# Patient Record
Sex: FEMALE | Race: WHITE | Employment: UNEMPLOYED | URBAN - METROPOLITAN AREA
[De-identification: names, ages, dates, MRNs, and addresses within clinical notes are randomized per-mention and may not be internally consistent; named-entity substitution may affect disease eponyms.]

---

## 2020-05-14 ENCOUNTER — OFFICE VISIT (OUTPATIENT)
Dept: FAMILY MEDICINE CLINIC | Age: 50
End: 2020-05-14

## 2020-05-14 VITALS
DIASTOLIC BLOOD PRESSURE: 82 MMHG | OXYGEN SATURATION: 98 % | WEIGHT: 210 LBS | SYSTOLIC BLOOD PRESSURE: 116 MMHG | HEART RATE: 89 BPM | BODY MASS INDEX: 33.89 KG/M2

## 2020-05-14 PROCEDURE — 99202 OFFICE O/P NEW SF 15 MIN: CPT | Performed by: FAMILY MEDICINE

## 2020-05-14 PROCEDURE — 96372 THER/PROPH/DIAG INJ SC/IM: CPT | Performed by: FAMILY MEDICINE

## 2020-05-14 RX ORDER — ESCITALOPRAM OXALATE 20 MG/1
10 TABLET ORAL DAILY
Qty: 30 TABLET | Refills: 12 | Status: SHIPPED | OUTPATIENT
Start: 2020-05-14 | End: 2021-07-17 | Stop reason: SDUPTHER

## 2020-05-14 RX ORDER — CYANOCOBALAMIN 1000 UG/ML
INJECTION INTRAMUSCULAR; SUBCUTANEOUS
Qty: 10 ML | Refills: 5 | Status: SHIPPED | OUTPATIENT
Start: 2020-05-14 | End: 2020-05-18 | Stop reason: SDUPTHER

## 2020-05-14 RX ORDER — CYANOCOBALAMIN 1000 UG/ML
1000 INJECTION INTRAMUSCULAR; SUBCUTANEOUS ONCE
Status: COMPLETED | OUTPATIENT
Start: 2020-05-14 | End: 2020-05-14

## 2020-05-14 RX ORDER — METOPROLOL TARTRATE 50 MG/1
50 TABLET, FILM COATED ORAL 2 TIMES DAILY
Qty: 60 TABLET | Refills: 5 | Status: SHIPPED | OUTPATIENT
Start: 2020-05-14 | End: 2021-07-17 | Stop reason: SDUPTHER

## 2020-05-14 RX ORDER — ESCITALOPRAM OXALATE 10 MG/1
10 TABLET ORAL DAILY
COMMUNITY
End: 2020-05-14

## 2020-05-14 RX ADMIN — CYANOCOBALAMIN 1000 MCG: 1000 INJECTION INTRAMUSCULAR; SUBCUTANEOUS at 11:20

## 2020-05-14 ASSESSMENT — PATIENT HEALTH QUESTIONNAIRE - PHQ9
SUM OF ALL RESPONSES TO PHQ QUESTIONS 1-9: 0
2. FEELING DOWN, DEPRESSED OR HOPELESS: 0
SUM OF ALL RESPONSES TO PHQ QUESTIONS 1-9: 0
1. LITTLE INTEREST OR PLEASURE IN DOING THINGS: 0
SUM OF ALL RESPONSES TO PHQ9 QUESTIONS 1 & 2: 0

## 2020-05-14 ASSESSMENT — ENCOUNTER SYMPTOMS
ABDOMINAL PAIN: 1
COUGH: 0
BACK PAIN: 0
SHORTNESS OF BREATH: 0
CHEST TIGHTNESS: 0
WHEEZING: 0

## 2020-05-14 NOTE — PROGRESS NOTES
Last 3 Encounters:   05/14/20 116/82     Wt Readings from Last 3 Encounters:   05/14/20 210 lb (95.3 kg)     Body mass index is 33.89 kg/m². No results found for this visit on 05/14/20. Physical Exam  Vitals signs and nursing note reviewed. Constitutional:       General: She is not in acute distress. Appearance: She is well-developed. She is not diaphoretic. HENT:      Head: Normocephalic. Nose: Nose normal.   Eyes:      General:         Right eye: No discharge. Left eye: No discharge. Conjunctiva/sclera: Conjunctivae normal.      Pupils: Pupils are equal, round, and reactive to light. Neck:      Musculoskeletal: Normal range of motion. Cardiovascular:      Rate and Rhythm: Normal rate and regular rhythm. Heart sounds: Normal heart sounds. No murmur. Pulmonary:      Effort: Pulmonary effort is normal. No respiratory distress. Breath sounds: Normal breath sounds. No wheezing or rales. Abdominal:      General: Abdomen is flat. There is no distension. Palpations: Abdomen is soft. There is no mass. Tenderness: There is no abdominal tenderness. There is no rebound. Hernia: No hernia is present. Skin:     General: Skin is warm and dry. Neurological:      Mental Status: She is alert and oriented to person, place, and time. Psychiatric:         Behavior: Behavior normal.         Intake paperwork reviewed in detail and scanned to chart. Controlled Substances Monitoring: few norco in march from provider in [place]                         _________________________________________________  Assessment:     Ilaemi Gold was seen today for new patient, palpitations, anxiety and fatigue. Diagnoses and all orders for this visit:    EMIR (generalized anxiety disorder)  -     escitalopram (LEXAPRO) 20 MG tablet; Take 0.5 tablets by mouth daily    Fatigue, unspecified type  -     cyanocobalamin 1000 MCG/ML injection;  As directed: TIW x 2 weeks, BIW x 2 weeks,

## 2020-05-18 RX ORDER — CYANOCOBALAMIN 1000 UG/ML
INJECTION INTRAMUSCULAR; SUBCUTANEOUS
Qty: 10 ML | Refills: 5 | Status: SHIPPED | OUTPATIENT
Start: 2020-05-18 | End: 2020-05-19 | Stop reason: SDUPTHER

## 2020-05-19 RX ORDER — CYANOCOBALAMIN 1000 UG/ML
INJECTION INTRAMUSCULAR; SUBCUTANEOUS
Qty: 10 ML | Refills: 5 | Status: SHIPPED | OUTPATIENT
Start: 2020-05-19 | End: 2021-07-17 | Stop reason: SDUPTHER

## 2021-03-16 ENCOUNTER — HOSPITAL ENCOUNTER (OUTPATIENT)
Age: 51
Setting detail: SPECIMEN
Discharge: HOME | End: 2021-03-16
Payer: COMMERCIAL

## 2021-03-16 PROCEDURE — 87624 HPV HI-RISK TYP POOLED RSLT: CPT

## 2021-03-16 PROCEDURE — 88142 CYTOPATH C/V THIN LAYER: CPT

## 2021-03-25 ENCOUNTER — HOSPITAL ENCOUNTER (OUTPATIENT)
Dept: LAB | Age: 51
Discharge: HOME | End: 2021-03-25
Payer: COMMERCIAL

## 2021-03-25 PROCEDURE — U0003 INFECTIOUS AGENT DETECTION BY NUCLEIC ACID (DNA OR RNA); SEVERE ACUTE RESPIRATORY SYNDROME CORONAVIRUS 2 (SARS-COV-2) (CORONAVIRUS DISEASE [COVID-19]), AMPLIFIED PROBE TECHNIQUE, MAKING USE OF HIGH THROUGHPUT TECHNOLOGIES AS DESCRIBED BY CMS-2020-01-R: HCPCS

## 2021-03-25 PROCEDURE — C9803 HOPD COVID-19 SPEC COLLECT: HCPCS

## 2021-03-25 PROCEDURE — U0005 INFEC AGEN DETEC AMPLI PROBE: HCPCS

## 2021-03-26 LAB
SARS-COV-2: NOT DETECTED
SOURCE: NORMAL

## 2021-03-26 RX ORDER — OMEPRAZOLE 20 MG/1
20 CAPSULE, DELAYED RELEASE ORAL DAILY
COMMUNITY
End: 2022-08-03 | Stop reason: SDUPTHER

## 2021-03-26 NOTE — PROGRESS NOTES
.,Surgery 3/31/21, you will be called 3/30/21with times               1. Do not eat or drink anything after midnight - unless instructed by your doctor prior to surgery. This includes                   no water, chewing gum or mints. 2. Follow your directions as prescribed by the doctor for your procedure and medications. Take Metoprolol & Omeprazole in am with a sip. 3. Check with your Doctor regarding stopping Plavix, Coumadin, Lovenox,Effient,Pradaxa,Xarelto, Fragmin or other blood thinners and                   follow their instructions. 4. Do not smoke, and do not drink any alcoholic beverages 24 hours prior to surgery. This includes NA Beer. 5. You may brush your teeth and gargle the morning of surgery. DO NOT SWALLOW WATER   6. You MUST make arrangements for a responsible adult to take you home after your surgery and be able to check on you every couple                   hours for the day. You will not be allowed to leave alone or drive yourself home. It is strongly suggested someone stay with you the first 24                   hrs. Your surgery will be cancelled if you do not have a ride home. 7. Please wear simple, loose fitting clothing to the hospital.  [facility] Offer not bring valuables (money, credit cards, checkbooks, etc.) Do not wear any                   makeup (including no eye makeup) or nail polish on your fingers or toes. 8. DO NOT wear any jewelry or piercings on day of surgery. All body piercing jewelry must be removed. 9. If you have dentures, they will be removed before going to the OR; we will provide you a container. If you wear contact lenses or glasses,                  they will be removed; please bring a case for them. 10. If you  have a Living Will and Durable Power of  for Healthcare, please bring in a copy.            11. Please bring picture ID,  insurance card, paperwork from the doctors office    (H & P, Consent, & card for implantable devices). 12. Take a shower the night before or morning of your procedure, do not apply any lotion, oil or powder. 13. Wear a mask covering your nose & mouth when entering the hospital. Have your covid-19 test performed within 10 days of your                  Surgery (tested neg 3/25/21). Quarantine yourself after the test until after your surgery.

## 2021-03-30 ENCOUNTER — ANESTHESIA EVENT (OUTPATIENT)
Dept: OPERATING ROOM | Age: 51
End: 2021-03-30
Payer: COMMERCIAL

## 2021-03-30 NOTE — ANESTHESIA PRE PROCEDURE
Department of Anesthesiology  Preprocedure Note       Name:  Radha Hatfield   Age:  54 y.o.  :  1970                                          MRN:  [id number]         Date:  3/30/2021      Surgeon: Adalgisa Love):  Dayana Ruiz DO    Procedure: Procedure(s):  DILATATION AND CURETTAGE HYSTEROSCOPY    Medications prior to admission:   Prior to Admission medications    Medication Sig Start Date End Date Taking? Authorizing Provider   omeprazole (PRILOSEC) 20 MG delayed release capsule Take 20 mg by mouth daily   Yes Historical Provider, MD   cyanocobalamin 1000 MCG/ML injection As directed: TIW x 2 weeks, BIW x 2 weeks, then weekly x 3 months. Then monthly. 20   Maria T Sommers MD   escitalopram (LEXAPRO) 20 MG tablet Take 0.5 tablets by mouth daily 20   Maria T Sommers MD   metoprolol tartrate (LOPRESSOR) 50 MG tablet Take 1 tablet by mouth 2 times daily 20   Maria T Sommers MD   SYRINGE-NEEDLE, DISP, 3 ML 25G X 1\" 3 ML MISC Use initially tiw for b12 dosing, then eventually to once monthly 20   Maria T Sommers MD       Current medications:    No current facility-administered medications for this encounter. Current Outpatient Medications   Medication Sig Dispense Refill    omeprazole (PRILOSEC) 20 MG delayed release capsule Take 20 mg by mouth daily      cyanocobalamin 1000 MCG/ML injection As directed: TIW x 2 weeks, BIW x 2 weeks, then weekly x 3 months. Then monthly. 10 mL 5    escitalopram (LEXAPRO) 20 MG tablet Take 0.5 tablets by mouth daily 30 tablet 12    metoprolol tartrate (LOPRESSOR) 50 MG tablet Take 1 tablet by mouth 2 times daily 60 tablet 5    SYRINGE-NEEDLE, DISP, 3 ML 25G X 1\" 3 ML MISC Use initially tiw for b12 dosing, then eventually to once monthly 10 each 5       Allergies:  No Known Allergies    Problem List:  There is no problem list on file for this patient.       Past Medical History:        Diagnosis Date    GERD (gastroesophageal reflux disease)     SVT (supraventricular tachycardia) (HCC)     Takes Metoprolol - previously saw Dr. Angel Taveras, now PCP       Past Surgical History:        Procedure Laterality Date    VENTRICULAR ABLATION SURGERY  2005       Social History:    Social History     Tobacco Use    Smoking status: Never Smoker    Smokeless tobacco: Never Used   Substance Use Topics    Alcohol use: Yes     Comment: 3-4 drinks a month                                Counseling given: Not Answered      Vital Signs (Current):   Vitals:    03/26/21 1444   Weight: 195 lb (88.5 kg)   Height: 5' 6\" (1.676 m)                                              BP Readings from Last 3 Encounters:   05/14/20 116/82       NPO Status:                                                                                 BMI:   Wt Readings from Last 3 Encounters:   05/14/20 210 lb (95.3 kg)     Body mass index is 31.47 kg/m². CBC:   Lab Results   Component Value Date    WBC 8.0 02/08/2015    RBC 4.81 02/08/2015    HGB 14.6 02/08/2015    HCT 43.5 02/08/2015    MCV 90.4 02/08/2015    RDW 12.7 02/08/2015     02/08/2015       CMP:   Lab Results   Component Value Date     02/08/2015    K 3.9 02/08/2015     02/08/2015    CO2 23 02/08/2015    BUN 12 02/08/2015    CREATININE 0.8 02/08/2015    GFRAA >60 02/08/2015    LABGLOM >60 02/08/2015    PROT 7.0 02/08/2015    CALCIUM 9.3 02/08/2015    BILITOT 0.7 02/08/2015    ALKPHOS 59 02/08/2015    AST 33 02/08/2015    ALT 50 02/08/2015       POC Tests: No results for input(s): POCGLU, POCNA, POCK, POCCL, POCBUN, POCHEMO, POCHCT in the last 72 hours.     Coags: No results found for: PROTIME, INR, APTT    HCG (If Applicable): No results found for: PREGTESTUR, PREGSERUM, HCG, HCGQUANT     ABGs: No results found for: PHART, PO2ART, XOA3XZE, ALA3HIR, BEART, N1ZWFMCQ     Type & Screen (If Applicable):  No results found for: LABABO, LABRH    Drug/Infectious Status (If Applicable):  No results found for: HIV, HEPCAB    COVID-19 Screening (If Applicable):   Lab Results   Component Value Date    COVID19 NOT DETECTED 03/25/2021           Anesthesia Evaluation  Patient summary reviewed  Airway: Mallampati: II        Dental: normal exam         Pulmonary:normal exam                               Cardiovascular:  Exercise tolerance: good (>4 METS),   (+) dysrhythmias: SVT,         Rhythm: regular  Rate: normal                    Neuro/Psych:   (+) depression/anxiety             GI/Hepatic/Renal:   (+) GERD:,           Endo/Other:              Pt had no PAT visit       Abdominal:           Vascular:                                      Anesthesia Plan      general     ASA 2     (Covid - 3/29/2021)  Induction: intravenous. MIPS: Postoperative opioids intended and Prophylactic antiemetics administered. Anesthetic plan and risks discussed with patient. Plan discussed with CRNA.     Attending anesthesiologist reviewed and agrees with Pre Eval content          INNA Willis - CRNA   3/30/2021

## 2021-03-31 ENCOUNTER — ANESTHESIA (OUTPATIENT)
Dept: OPERATING ROOM | Age: 51
End: 2021-03-31
Payer: COMMERCIAL

## 2021-03-31 ENCOUNTER — HOSPITAL ENCOUNTER (OUTPATIENT)
Age: 51
Setting detail: OUTPATIENT SURGERY
Discharge: HOME | End: 2021-03-31
Attending: OBSTETRICS & GYNECOLOGY | Admitting: OBSTETRICS & GYNECOLOGY
Payer: COMMERCIAL

## 2021-03-31 VITALS
SYSTOLIC BLOOD PRESSURE: 119 MMHG | RESPIRATION RATE: 16 BRPM | BODY MASS INDEX: 31.34 KG/M2 | TEMPERATURE: 98.1 F | HEART RATE: 70 BPM | HEIGHT: 66 IN | DIASTOLIC BLOOD PRESSURE: 56 MMHG | WEIGHT: 195 LBS | OXYGEN SATURATION: 98 %

## 2021-03-31 VITALS
DIASTOLIC BLOOD PRESSURE: 93 MMHG | OXYGEN SATURATION: 99 % | RESPIRATION RATE: 5 BRPM | TEMPERATURE: 97.7 F | SYSTOLIC BLOOD PRESSURE: 129 MMHG

## 2021-03-31 DIAGNOSIS — N92.1 MENORRHAGIA WITH IRREGULAR CYCLE: ICD-10-CM

## 2021-03-31 DIAGNOSIS — G89.18 POST-OP PAIN: Primary | ICD-10-CM

## 2021-03-31 LAB — PREGNANCY TEST URINE, POC: NEGATIVE

## 2021-03-31 PROCEDURE — 3700000000 HC ANESTHESIA ATTENDED CARE: Performed by: OBSTETRICS & GYNECOLOGY

## 2021-03-31 PROCEDURE — 81025 URINE PREGNANCY TEST: CPT

## 2021-03-31 PROCEDURE — 7100000000 HC PACU RECOVERY - FIRST 15 MIN: Performed by: OBSTETRICS & GYNECOLOGY

## 2021-03-31 PROCEDURE — 6360000002 HC RX W HCPCS: Performed by: ANESTHESIOLOGY

## 2021-03-31 PROCEDURE — 88305 TISSUE EXAM BY PATHOLOGIST: CPT | Performed by: PATHOLOGY

## 2021-03-31 PROCEDURE — 3600000003 HC SURGERY LEVEL 3 BASE: Performed by: OBSTETRICS & GYNECOLOGY

## 2021-03-31 PROCEDURE — 7100000011 HC PHASE II RECOVERY - ADDTL 15 MIN: Performed by: OBSTETRICS & GYNECOLOGY

## 2021-03-31 PROCEDURE — 7100000010 HC PHASE II RECOVERY - FIRST 15 MIN: Performed by: OBSTETRICS & GYNECOLOGY

## 2021-03-31 PROCEDURE — 7100000001 HC PACU RECOVERY - ADDTL 15 MIN: Performed by: OBSTETRICS & GYNECOLOGY

## 2021-03-31 PROCEDURE — 2709999900 HC NON-CHARGEABLE SUPPLY: Performed by: OBSTETRICS & GYNECOLOGY

## 2021-03-31 PROCEDURE — 3700000001 HC ADD 15 MINUTES (ANESTHESIA): Performed by: OBSTETRICS & GYNECOLOGY

## 2021-03-31 PROCEDURE — 6360000002 HC RX W HCPCS: Performed by: NURSE ANESTHETIST, CERTIFIED REGISTERED

## 2021-03-31 PROCEDURE — 3600000013 HC SURGERY LEVEL 3 ADDTL 15MIN: Performed by: OBSTETRICS & GYNECOLOGY

## 2021-03-31 PROCEDURE — 2580000003 HC RX 258: Performed by: ANESTHESIOLOGY

## 2021-03-31 RX ORDER — HYDRALAZINE HYDROCHLORIDE 20 MG/ML
5 INJECTION INTRAMUSCULAR; INTRAVENOUS EVERY 10 MIN PRN
Status: DISCONTINUED | OUTPATIENT
Start: 2021-03-31 | End: 2021-03-31 | Stop reason: HOSPADM

## 2021-03-31 RX ORDER — DEXAMETHASONE SODIUM PHOSPHATE 4 MG/ML
INJECTION, SOLUTION INTRA-ARTICULAR; INTRALESIONAL; INTRAMUSCULAR; INTRAVENOUS; SOFT TISSUE PRN
Status: DISCONTINUED | OUTPATIENT
Start: 2021-03-31 | End: 2021-03-31 | Stop reason: SDUPTHER

## 2021-03-31 RX ORDER — ONDANSETRON 2 MG/ML
INJECTION INTRAMUSCULAR; INTRAVENOUS PRN
Status: DISCONTINUED | OUTPATIENT
Start: 2021-03-31 | End: 2021-03-31 | Stop reason: SDUPTHER

## 2021-03-31 RX ORDER — HYDROMORPHONE HCL 110MG/55ML
0.25 PATIENT CONTROLLED ANALGESIA SYRINGE INTRAVENOUS EVERY 5 MIN PRN
Status: DISCONTINUED | OUTPATIENT
Start: 2021-03-31 | End: 2021-03-31 | Stop reason: HOSPADM

## 2021-03-31 RX ORDER — LABETALOL HYDROCHLORIDE 5 MG/ML
5 INJECTION, SOLUTION INTRAVENOUS EVERY 10 MIN PRN
Status: DISCONTINUED | OUTPATIENT
Start: 2021-03-31 | End: 2021-03-31 | Stop reason: HOSPADM

## 2021-03-31 RX ORDER — MORPHINE SULFATE 2 MG/ML
2 INJECTION, SOLUTION INTRAMUSCULAR; INTRAVENOUS EVERY 5 MIN PRN
Status: DISCONTINUED | OUTPATIENT
Start: 2021-03-31 | End: 2021-03-31 | Stop reason: HOSPADM

## 2021-03-31 RX ORDER — ACETAMINOPHEN AND CODEINE PHOSPHATE 300; 30 MG/1; MG/1
1-2 TABLET ORAL EVERY 6 HOURS PRN
Qty: 10 TABLET | Refills: 0 | Status: SHIPPED | OUTPATIENT
Start: 2021-03-31 | End: 2021-04-03

## 2021-03-31 RX ORDER — LIDOCAINE HYDROCHLORIDE 20 MG/ML
INJECTION, SOLUTION INTRAVENOUS PRN
Status: DISCONTINUED | OUTPATIENT
Start: 2021-03-31 | End: 2021-03-31 | Stop reason: SDUPTHER

## 2021-03-31 RX ORDER — HYDROMORPHONE HCL 110MG/55ML
0.5 PATIENT CONTROLLED ANALGESIA SYRINGE INTRAVENOUS EVERY 5 MIN PRN
Status: DISCONTINUED | OUTPATIENT
Start: 2021-03-31 | End: 2021-03-31 | Stop reason: HOSPADM

## 2021-03-31 RX ORDER — PROMETHAZINE HYDROCHLORIDE 25 MG/ML
6.25 INJECTION, SOLUTION INTRAMUSCULAR; INTRAVENOUS
Status: DISCONTINUED | OUTPATIENT
Start: 2021-03-31 | End: 2021-03-31 | Stop reason: HOSPADM

## 2021-03-31 RX ORDER — KETOROLAC TROMETHAMINE 30 MG/ML
INJECTION, SOLUTION INTRAMUSCULAR; INTRAVENOUS PRN
Status: DISCONTINUED | OUTPATIENT
Start: 2021-03-31 | End: 2021-03-31 | Stop reason: SDUPTHER

## 2021-03-31 RX ORDER — SODIUM CHLORIDE, SODIUM LACTATE, POTASSIUM CHLORIDE, CALCIUM CHLORIDE 600; 310; 30; 20 MG/100ML; MG/100ML; MG/100ML; MG/100ML
INJECTION, SOLUTION INTRAVENOUS CONTINUOUS
Status: DISCONTINUED | OUTPATIENT
Start: 2021-03-31 | End: 2021-03-31 | Stop reason: HOSPADM

## 2021-03-31 RX ORDER — PROPOFOL 10 MG/ML
INJECTION, EMULSION INTRAVENOUS PRN
Status: DISCONTINUED | OUTPATIENT
Start: 2021-03-31 | End: 2021-03-31 | Stop reason: SDUPTHER

## 2021-03-31 RX ORDER — FENTANYL CITRATE 50 UG/ML
25 INJECTION, SOLUTION INTRAMUSCULAR; INTRAVENOUS EVERY 5 MIN PRN
Status: DISCONTINUED | OUTPATIENT
Start: 2021-03-31 | End: 2021-03-31 | Stop reason: HOSPADM

## 2021-03-31 RX ORDER — FENTANYL CITRATE 50 UG/ML
INJECTION, SOLUTION INTRAMUSCULAR; INTRAVENOUS PRN
Status: DISCONTINUED | OUTPATIENT
Start: 2021-03-31 | End: 2021-03-31 | Stop reason: SDUPTHER

## 2021-03-31 RX ADMIN — SODIUM CHLORIDE, POTASSIUM CHLORIDE, SODIUM LACTATE AND CALCIUM CHLORIDE: 600; 310; 30; 20 INJECTION, SOLUTION INTRAVENOUS at 09:18

## 2021-03-31 RX ADMIN — DEXAMETHASONE SODIUM PHOSPHATE 8 MG: 4 INJECTION, SOLUTION INTRAMUSCULAR; INTRAVENOUS at 10:07

## 2021-03-31 RX ADMIN — PROPOFOL 150 MG: 10 INJECTION, EMULSION INTRAVENOUS at 10:07

## 2021-03-31 RX ADMIN — FENTANYL CITRATE 25 MCG: 50 INJECTION INTRAMUSCULAR; INTRAVENOUS at 11:01

## 2021-03-31 RX ADMIN — ONDANSETRON 4 MG: 2 INJECTION INTRAMUSCULAR; INTRAVENOUS at 10:07

## 2021-03-31 RX ADMIN — SODIUM CHLORIDE, POTASSIUM CHLORIDE, SODIUM LACTATE AND CALCIUM CHLORIDE: 600; 310; 30; 20 INJECTION, SOLUTION INTRAVENOUS at 10:05

## 2021-03-31 RX ADMIN — LIDOCAINE HYDROCHLORIDE 50 MG: 20 INJECTION, SOLUTION INTRAVENOUS at 10:07

## 2021-03-31 RX ADMIN — KETOROLAC TROMETHAMINE 30 MG: 30 INJECTION, SOLUTION INTRAMUSCULAR; INTRAVENOUS at 10:29

## 2021-03-31 RX ADMIN — FENTANYL CITRATE 100 MCG: 50 INJECTION, SOLUTION INTRAMUSCULAR; INTRAVENOUS at 10:07

## 2021-03-31 ASSESSMENT — PULMONARY FUNCTION TESTS
PIF_VALUE: 10
PIF_VALUE: 10
PIF_VALUE: 8
PIF_VALUE: 1
PIF_VALUE: 3
PIF_VALUE: 11
PIF_VALUE: 10
PIF_VALUE: 11
PIF_VALUE: 2
PIF_VALUE: 11
PIF_VALUE: 8
PIF_VALUE: 4
PIF_VALUE: 23
PIF_VALUE: 24
PIF_VALUE: 10
PIF_VALUE: 10
PIF_VALUE: 9
PIF_VALUE: 5

## 2021-03-31 ASSESSMENT — PAIN DESCRIPTION - FREQUENCY: FREQUENCY: CONTINUOUS

## 2021-03-31 ASSESSMENT — PAIN SCALES - GENERAL: PAINLEVEL_OUTOF10: 2

## 2021-03-31 ASSESSMENT — PAIN DESCRIPTION - DESCRIPTORS
DESCRIPTORS: ACHING
DESCRIPTORS: CRAMPING

## 2021-03-31 ASSESSMENT — PAIN DESCRIPTION - LOCATION: LOCATION: ABDOMEN

## 2021-03-31 ASSESSMENT — PAIN DESCRIPTION - ORIENTATION: ORIENTATION: RIGHT

## 2021-03-31 ASSESSMENT — PAIN DESCRIPTION - PAIN TYPE: TYPE: SURGICAL PAIN

## 2021-03-31 NOTE — PROGRESS NOTES
1045 - transferred from OR on cart, monitor applied, alarms on and verified, bedside handoff provided by Rober Lopez and Juan Alanis CRNA  1101 - medicated for complaint of surgical pain/discomfort  1110 - tolerating ice chips  1115 - phase one care complete  1123 - transferred to [facility] 9

## 2021-03-31 NOTE — DISCHARGE SUMMARY
Admission date:3/31/21  Discharge date:3/31/21  Admission Diagnosis: Menorrhagia  Discharge Diagnosis: Same  Procedure D and C with Hysteroscopy  Hospital Course: No complications with procedure and normal Same Day Surgery care in PACU and release home with instructions and prescription for Tylenol #3 given.   Condition on Discharge: Good

## 2021-03-31 NOTE — PROGRESS NOTES
1255-pt walked to restroom, brief and pad changed, denies need  1305- Discharge instructions reviewed with pt and  Benita, no questions  1310- Ice chips provided, pt dressing  1316- pt discharged via car

## 2021-04-01 NOTE — OP NOTE
[address] [place] [address], [address] [place] [address]                                OPERATIVE REPORT    PATIENT NAME: Valerie Archer               :        1970  MED REC NO:   [id number]                          ROOM:  ACCOUNT NO:   [de-identified]                           ADMIT DATE: 2021  PROVIDER:     Isaac Espinoza DO    DATE OF PROCEDURE:  2021    PREOPERATIVE DIAGNOSIS:  Menorrhagia. POSTOPERATIVE DIAGNOSIS:  Menorrhagia. OPERATION PERFORMED:  D and C with hysteroscopy. SURGEON:  Isaac Espinoza DO    ANESTHESIA:  General.    ESTIMATED BLOOD LOSS:  5 mL. COMPLICATIONS:  None. OPERATIVE FINDINGS:  Uterine cavity of 9 cm was noted and a large volume  of redundant polypoid tissue. Otherwise, normal pelvic exam.    OPERATIVE PROCEDURE:  The patient was taken to the operating room and  after general anesthetic induction, was sterilely prepped and draped in  the usual manner for this procedure. A single-tooth tenaculum was  placed on the anterior lip of the cervix and endocervical curetting was  obtained. There was also a distinct cervical polyp that was noted and  removed as a specimen. With adequate dilation of the cervix, a  hysteroscope was utilized with saline for distending medium. Findings  as noted above. Scope was removed. Cervix was dilated further allowing  #2 sharp curette as well as polyp forceps to remove the excess  endometrium noted in the uterine cavity. When this was accomplished,  the procedure was thus finalized. The single-tooth tenaculum was  removed. Sponge and instrument count was correct. The patient having  tolerated the procedure well, was taken to the recovery room in stable  condition. Good prognosis was given.         Lissa Snellen, DO    D: 2021 17:05:02       T: 2021 22:20:58     [email or web address]  Job#: [id number]     Doc#: [id number]    CC:

## 2021-07-16 DIAGNOSIS — E53.8 B12 DEFICIENCY: ICD-10-CM

## 2021-07-16 DIAGNOSIS — R00.2 PALPITATION: ICD-10-CM

## 2021-07-16 DIAGNOSIS — F41.1 GAD (GENERALIZED ANXIETY DISORDER): ICD-10-CM

## 2021-07-16 DIAGNOSIS — R53.83 FATIGUE, UNSPECIFIED TYPE: ICD-10-CM

## 2021-07-17 RX ORDER — ESCITALOPRAM OXALATE 20 MG/1
10 TABLET ORAL DAILY
Qty: 30 TABLET | Refills: 12 | Status: SHIPPED | OUTPATIENT
Start: 2021-07-17 | End: 2022-08-03 | Stop reason: SDUPTHER

## 2021-07-17 RX ORDER — CYANOCOBALAMIN 1000 UG/ML
INJECTION INTRAMUSCULAR; SUBCUTANEOUS
Qty: 10 ML | Refills: 5 | Status: SHIPPED | OUTPATIENT
Start: 2021-07-17 | End: 2022-08-10

## 2021-07-17 RX ORDER — METOPROLOL TARTRATE 50 MG/1
50 TABLET, FILM COATED ORAL 2 TIMES DAILY
Qty: 60 TABLET | Refills: 5 | Status: SHIPPED | OUTPATIENT
Start: 2021-07-17 | End: 2022-08-03

## 2021-08-27 NOTE — PROGRESS NOTES
8/27/21 1250 I called patient to do an over the phone PAT assessment and patient told me she will be canceling her surgery on 9/8/21 and will reschedule later on due to conflict with work schedule.

## 2021-08-27 NOTE — PROGRESS NOTES
8/27/21 0242 I called Dr. Gary Colon office to inform them that when I called the patient she stated she was going to cancel her surgery scheduled for 9/8/21 due to a work conflict and try to reschedule sometime later in October or so. PAT assessment not completed on patient.

## 2021-09-01 ENCOUNTER — HOSPITAL ENCOUNTER (OUTPATIENT)
Dept: PREADMISSION TESTING | Age: 51
Discharge: HOME | End: 2021-09-01

## 2022-08-01 DIAGNOSIS — F41.1 GAD (GENERALIZED ANXIETY DISORDER): ICD-10-CM

## 2022-08-01 DIAGNOSIS — R00.2 PALPITATION: ICD-10-CM

## 2022-08-03 RX ORDER — ESCITALOPRAM OXALATE 20 MG/1
10 TABLET ORAL DAILY
Qty: 30 TABLET | Refills: 2 | Status: SHIPPED | OUTPATIENT
Start: 2022-08-03

## 2022-08-03 RX ORDER — OMEPRAZOLE 20 MG/1
20 CAPSULE, DELAYED RELEASE ORAL DAILY
Qty: 30 CAPSULE | Refills: 2 | Status: SHIPPED | OUTPATIENT
Start: 2022-08-03

## 2022-08-03 RX ORDER — METOPROLOL TARTRATE 50 MG/1
TABLET, FILM COATED ORAL
Qty: 60 TABLET | Refills: 2 | Status: SHIPPED | OUTPATIENT
Start: 2022-08-03

## 2022-08-09 DIAGNOSIS — E53.8 B12 DEFICIENCY: ICD-10-CM

## 2022-08-09 DIAGNOSIS — R53.83 FATIGUE, UNSPECIFIED TYPE: ICD-10-CM

## 2022-08-10 RX ORDER — CYANOCOBALAMIN 1000 UG/ML
INJECTION INTRAMUSCULAR; SUBCUTANEOUS
Qty: 10 ML | Refills: 0 | Status: SHIPPED | OUTPATIENT
Start: 2022-08-10

## 2022-08-10 RX ORDER — SYRINGE WITH NEEDLE, 1 ML 25GX5/8"
SYRINGE, EMPTY DISPOSABLE MISCELLANEOUS
Qty: 10 EACH | Refills: 0 | Status: SHIPPED | OUTPATIENT
Start: 2022-08-10

## 2022-08-10 NOTE — TELEPHONE ENCOUNTER
This medication was sent. We have not seen her for 2 years. We will call her and make sure she knows she must keep her October appointment.

## 2022-11-19 ENCOUNTER — APPOINTMENT (OUTPATIENT)
Dept: CT IMAGING | Age: 52
DRG: 137 | End: 2022-11-19
Payer: COMMERCIAL

## 2022-11-19 ENCOUNTER — HOSPITAL ENCOUNTER (INPATIENT)
Age: 52
LOS: 2 days | Discharge: HOME | DRG: 137 | End: 2022-11-22
Attending: EMERGENCY MEDICINE | Admitting: STUDENT IN AN ORGANIZED HEALTH CARE EDUCATION/TRAINING PROGRAM
Payer: COMMERCIAL

## 2022-11-19 ENCOUNTER — APPOINTMENT (OUTPATIENT)
Dept: GENERAL RADIOLOGY | Age: 52
DRG: 137 | End: 2022-11-19
Payer: COMMERCIAL

## 2022-11-19 DIAGNOSIS — R55 SYNCOPE AND COLLAPSE: Primary | ICD-10-CM

## 2022-11-19 DIAGNOSIS — U07.1 COVID-19: ICD-10-CM

## 2022-11-19 DIAGNOSIS — R09.02 HYPOXIA: ICD-10-CM

## 2022-11-19 LAB
ALBUMIN SERPL-MCNC: 4 GM/DL (ref 3.4–5)
ALP BLD-CCNC: 121 IU/L (ref 40–129)
ALT SERPL-CCNC: 18 U/L (ref 10–40)
ANION GAP SERPL CALCULATED.3IONS-SCNC: 12 MMOL/L (ref 4–16)
AST SERPL-CCNC: 26 IU/L (ref 15–37)
BASOPHILS ABSOLUTE: 0 K/CU MM
BASOPHILS RELATIVE PERCENT: 0.6 % (ref 0–1)
BILIRUB SERPL-MCNC: 0.4 MG/DL (ref 0–1)
BUN BLDV-MCNC: 7 MG/DL (ref 6–23)
CALCIUM SERPL-MCNC: 9.4 MG/DL (ref 8.3–10.6)
CHLORIDE BLD-SCNC: 95 MMOL/L (ref 99–110)
CO2: 24 MMOL/L (ref 21–32)
CREAT SERPL-MCNC: 0.5 MG/DL (ref 0.6–1.1)
DIFFERENTIAL TYPE: ABNORMAL
EOSINOPHILS ABSOLUTE: 0.1 K/CU MM
EOSINOPHILS RELATIVE PERCENT: 1.1 % (ref 0–3)
GFR SERPL CREATININE-BSD FRML MDRD: >60 ML/MIN/1.73M2
GLUCOSE BLD-MCNC: 227 MG/DL (ref 70–99)
HCT VFR BLD CALC: 39.5 % (ref 37–47)
HEMOGLOBIN: 12.6 GM/DL (ref 12.5–16)
IMMATURE NEUTROPHIL %: 0.1 % (ref 0–0.43)
LYMPHOCYTES ABSOLUTE: 1.3 K/CU MM
LYMPHOCYTES RELATIVE PERCENT: 18.6 % (ref 24–44)
MCH RBC QN AUTO: 24.2 PG (ref 27–31)
MCHC RBC AUTO-ENTMCNC: 31.9 % (ref 32–36)
MCV RBC AUTO: 76 FL (ref 78–100)
MONOCYTES ABSOLUTE: 0.7 K/CU MM
MONOCYTES RELATIVE PERCENT: 9.8 % (ref 0–4)
NUCLEATED RBC %: 0 %
PDW BLD-RTO: 15.7 % (ref 11.7–14.9)
PLATELET # BLD: 174 K/CU MM (ref 140–440)
PMV BLD AUTO: 11.8 FL (ref 7.5–11.1)
POTASSIUM SERPL-SCNC: 3.7 MMOL/L (ref 3.5–5.1)
PRO-BNP: 30.95 PG/ML
RBC # BLD: 5.2 M/CU MM (ref 4.2–5.4)
SARS-COV-2, NAAT: DETECTED
SEGMENTED NEUTROPHILS ABSOLUTE COUNT: 4.9 K/CU MM
SEGMENTED NEUTROPHILS RELATIVE PERCENT: 69.8 % (ref 36–66)
SODIUM BLD-SCNC: 131 MMOL/L (ref 135–145)
SOURCE: ABNORMAL
TOTAL IMMATURE NEUTOROPHIL: 0.01 K/CU MM
TOTAL NUCLEATED RBC: 0 K/CU MM
TOTAL PROTEIN: 7.3 GM/DL (ref 6.4–8.2)
TROPONIN T: <0.01 NG/ML
WBC # BLD: 7.1 K/CU MM (ref 4–10.5)

## 2022-11-19 PROCEDURE — 6360000002 HC RX W HCPCS: Performed by: EMERGENCY MEDICINE

## 2022-11-19 PROCEDURE — 96375 TX/PRO/DX INJ NEW DRUG ADDON: CPT

## 2022-11-19 PROCEDURE — 71045 X-RAY EXAM CHEST 1 VIEW: CPT

## 2022-11-19 PROCEDURE — 96361 HYDRATE IV INFUSION ADD-ON: CPT

## 2022-11-19 PROCEDURE — 87635 SARS-COV-2 COVID-19 AMP PRB: CPT

## 2022-11-19 PROCEDURE — 2580000003 HC RX 258: Performed by: PHYSICIAN ASSISTANT

## 2022-11-19 PROCEDURE — 87804 INFLUENZA ASSAY W/OPTIC: CPT

## 2022-11-19 PROCEDURE — 87430 STREP A AG IA: CPT

## 2022-11-19 PROCEDURE — 87081 CULTURE SCREEN ONLY: CPT

## 2022-11-19 PROCEDURE — 93005 ELECTROCARDIOGRAM TRACING: CPT | Performed by: EMERGENCY MEDICINE

## 2022-11-19 PROCEDURE — 2580000003 HC RX 258: Performed by: EMERGENCY MEDICINE

## 2022-11-19 PROCEDURE — 6360000004 HC RX CONTRAST MEDICATION: Performed by: EMERGENCY MEDICINE

## 2022-11-19 PROCEDURE — 70450 CT HEAD/BRAIN W/O DYE: CPT

## 2022-11-19 PROCEDURE — 70491 CT SOFT TISSUE NECK W/DYE: CPT

## 2022-11-19 PROCEDURE — 6360000002 HC RX W HCPCS: Performed by: PHYSICIAN ASSISTANT

## 2022-11-19 PROCEDURE — 6360000004 HC RX CONTRAST MEDICATION: Performed by: PHYSICIAN ASSISTANT

## 2022-11-19 PROCEDURE — 99285 EMERGENCY DEPT VISIT HI MDM: CPT

## 2022-11-19 PROCEDURE — 83880 ASSAY OF NATRIURETIC PEPTIDE: CPT

## 2022-11-19 PROCEDURE — 84484 ASSAY OF TROPONIN QUANT: CPT

## 2022-11-19 PROCEDURE — 6370000000 HC RX 637 (ALT 250 FOR IP): Performed by: EMERGENCY MEDICINE

## 2022-11-19 PROCEDURE — 71275 CT ANGIOGRAPHY CHEST: CPT

## 2022-11-19 PROCEDURE — 96374 THER/PROPH/DIAG INJ IV PUSH: CPT

## 2022-11-19 PROCEDURE — 80053 COMPREHEN METABOLIC PANEL: CPT

## 2022-11-19 PROCEDURE — 85025 COMPLETE CBC W/AUTO DIFF WBC: CPT

## 2022-11-19 RX ORDER — SODIUM CHLORIDE 0.9 % (FLUSH) 0.9 %
5-40 SYRINGE (ML) INJECTION 2 TIMES DAILY
Status: DISCONTINUED | OUTPATIENT
Start: 2022-11-19 | End: 2022-11-22 | Stop reason: HOSPADM

## 2022-11-19 RX ORDER — ACETAMINOPHEN 325 MG/1
650 TABLET ORAL ONCE
Status: DISCONTINUED | OUTPATIENT
Start: 2022-11-19 | End: 2022-11-19

## 2022-11-19 RX ORDER — KETOROLAC TROMETHAMINE 30 MG/ML
15 INJECTION, SOLUTION INTRAMUSCULAR; INTRAVENOUS ONCE
Status: COMPLETED | OUTPATIENT
Start: 2022-11-19 | End: 2022-11-19

## 2022-11-19 RX ORDER — DEXAMETHASONE SODIUM PHOSPHATE 10 MG/ML
10 INJECTION, SOLUTION INTRAMUSCULAR; INTRAVENOUS ONCE
Status: COMPLETED | OUTPATIENT
Start: 2022-11-19 | End: 2022-11-19

## 2022-11-19 RX ORDER — ONDANSETRON 2 MG/ML
4 INJECTION INTRAMUSCULAR; INTRAVENOUS ONCE
Status: COMPLETED | OUTPATIENT
Start: 2022-11-19 | End: 2022-11-19

## 2022-11-19 RX ORDER — 0.9 % SODIUM CHLORIDE 0.9 %
1000 INTRAVENOUS SOLUTION INTRAVENOUS ONCE
Status: COMPLETED | OUTPATIENT
Start: 2022-11-19 | End: 2022-11-19

## 2022-11-19 RX ORDER — ACETAMINOPHEN 160 MG/5ML
650 SUSPENSION, ORAL (FINAL DOSE FORM) ORAL ONCE
Status: COMPLETED | OUTPATIENT
Start: 2022-11-19 | End: 2022-11-19

## 2022-11-19 RX ORDER — 0.9 % SODIUM CHLORIDE 0.9 %
500 INTRAVENOUS SOLUTION INTRAVENOUS ONCE
Status: COMPLETED | OUTPATIENT
Start: 2022-11-20 | End: 2022-11-20

## 2022-11-19 RX ORDER — KETOROLAC TROMETHAMINE 30 MG/ML
15 INJECTION, SOLUTION INTRAMUSCULAR; INTRAVENOUS ONCE
Status: COMPLETED | OUTPATIENT
Start: 2022-11-20 | End: 2022-11-20

## 2022-11-19 RX ADMIN — SODIUM CHLORIDE 1000 ML: 9 INJECTION, SOLUTION INTRAVENOUS at 21:06

## 2022-11-19 RX ADMIN — ACETAMINOPHEN 650 MG: 160 SUSPENSION ORAL at 18:58

## 2022-11-19 RX ADMIN — IOPAMIDOL 75 ML: 755 INJECTION, SOLUTION INTRAVENOUS at 23:27

## 2022-11-19 RX ADMIN — ONDANSETRON 4 MG: 2 INJECTION INTRAMUSCULAR; INTRAVENOUS at 19:05

## 2022-11-19 RX ADMIN — SODIUM CHLORIDE 1000 ML: 9 INJECTION, SOLUTION INTRAVENOUS at 18:41

## 2022-11-19 RX ADMIN — DEXAMETHASONE SODIUM PHOSPHATE 10 MG: 10 INJECTION, SOLUTION INTRAMUSCULAR; INTRAVENOUS at 19:37

## 2022-11-19 RX ADMIN — KETOROLAC TROMETHAMINE 15 MG: 30 INJECTION, SOLUTION INTRAMUSCULAR; INTRAVENOUS at 19:37

## 2022-11-19 RX ADMIN — IOPAMIDOL 75 ML: 755 INJECTION, SOLUTION INTRAVENOUS at 20:14

## 2022-11-19 ASSESSMENT — PAIN - FUNCTIONAL ASSESSMENT: PAIN_FUNCTIONAL_ASSESSMENT: 0-10

## 2022-11-19 ASSESSMENT — PAIN SCALES - GENERAL
PAINLEVEL_OUTOF10: 10
PAINLEVEL_OUTOF10: 10

## 2022-11-19 ASSESSMENT — PAIN DESCRIPTION - DESCRIPTORS: DESCRIPTORS: ACHING

## 2022-11-19 ASSESSMENT — PAIN DESCRIPTION - ONSET: ONSET: PROGRESSIVE

## 2022-11-19 ASSESSMENT — PAIN DESCRIPTION - FREQUENCY: FREQUENCY: CONTINUOUS

## 2022-11-20 PROBLEM — U07.1 COVID: Status: ACTIVE | Noted: 2022-11-20

## 2022-11-20 PROBLEM — R55 SYNCOPAL EPISODES: Status: ACTIVE | Noted: 2022-11-20

## 2022-11-20 PROBLEM — J96.01 ACUTE RESPIRATORY FAILURE WITH HYPOXIA (HCC): Status: ACTIVE | Noted: 2022-11-20

## 2022-11-20 LAB
ADENOVIRUS DETECTION BY PCR: NOT DETECTED
BILIRUBIN URINE: NEGATIVE MG/DL
BLOOD, URINE: NEGATIVE
BORDETELLA PARAPERTUSSIS BY PCR: NOT DETECTED
BORDETELLA PERTUSSIS PCR: NOT DETECTED
C-REACTIVE PROTEIN, HIGH SENSITIVITY: 39.4 MG/L
C-REACTIVE PROTEIN, HIGH SENSITIVITY: 41.1 MG/L
CHLAMYDOPHILA PNEUMONIA PCR: NOT DETECTED
CLARITY: CLEAR
COLOR: YELLOW
CORONAVIRUS 229E PCR: NOT DETECTED
CORONAVIRUS HKU1 PCR: NOT DETECTED
CORONAVIRUS NL63 PCR: NOT DETECTED
CORONAVIRUS OC43 PCR: NOT DETECTED
GLUCOSE, URINE: 500 MG/DL
HUMAN METAPNEUMOVIRUS PCR: NOT DETECTED
INFLUENZA A BY PCR: NOT DETECTED
INFLUENZA A H1 (2009) PCR: NOT DETECTED
INFLUENZA A H1 PANDEMIC PCR: NOT DETECTED
INFLUENZA A H3 PCR: NOT DETECTED
INFLUENZA B BY PCR: NOT DETECTED
KETONES, URINE: 40 MG/DL
LACTATE DEHYDROGENASE: 135 IU/L (ref 120–246)
LEUKOCYTE ESTERASE, URINE: NEGATIVE
MYCOPLASMA PNEUMONIAE PCR: NOT DETECTED
NITRITE URINE, QUANTITATIVE: POSITIVE
PARAINFLUENZA 1 PCR: NOT DETECTED
PARAINFLUENZA 2 PCR: NOT DETECTED
PARAINFLUENZA 3 PCR: NOT DETECTED
PARAINFLUENZA 4 PCR: NOT DETECTED
PH, URINE: 5 (ref 5–8)
PROTEIN UA: NEGATIVE MG/DL
RHINOVIRUS ENTEROVIRUS PCR: NOT DETECTED
RSV PCR: NOT DETECTED
SARS-COV-2: ABNORMAL
SPECIFIC GRAVITY UA: <1.005 (ref 1–1.03)
UROBILINOGEN, URINE: 1 MG/DL (ref 0.2–1)

## 2022-11-20 PROCEDURE — 81003 URINALYSIS AUTO W/O SCOPE: CPT

## 2022-11-20 PROCEDURE — 96374 THER/PROPH/DIAG INJ IV PUSH: CPT

## 2022-11-20 PROCEDURE — 6370000000 HC RX 637 (ALT 250 FOR IP)

## 2022-11-20 PROCEDURE — 6370000000 HC RX 637 (ALT 250 FOR IP): Performed by: STUDENT IN AN ORGANIZED HEALTH CARE EDUCATION/TRAINING PROGRAM

## 2022-11-20 PROCEDURE — 6360000002 HC RX W HCPCS: Performed by: STUDENT IN AN ORGANIZED HEALTH CARE EDUCATION/TRAINING PROGRAM

## 2022-11-20 PROCEDURE — 6370000000 HC RX 637 (ALT 250 FOR IP): Performed by: PHYSICIAN ASSISTANT

## 2022-11-20 PROCEDURE — 1200000000 HC SEMI PRIVATE

## 2022-11-20 PROCEDURE — 2580000003 HC RX 258: Performed by: STUDENT IN AN ORGANIZED HEALTH CARE EDUCATION/TRAINING PROGRAM

## 2022-11-20 PROCEDURE — 2580000003 HC RX 258: Performed by: PHYSICIAN ASSISTANT

## 2022-11-20 PROCEDURE — 0202U NFCT DS 22 TRGT SARS-COV-2: CPT

## 2022-11-20 PROCEDURE — 2580000003 HC RX 258: Performed by: EMERGENCY MEDICINE

## 2022-11-20 PROCEDURE — 6360000002 HC RX W HCPCS: Performed by: PHYSICIAN ASSISTANT

## 2022-11-20 PROCEDURE — 94761 N-INVAS EAR/PLS OXIMETRY MLT: CPT

## 2022-11-20 PROCEDURE — 83615 LACTATE (LD) (LDH) ENZYME: CPT

## 2022-11-20 PROCEDURE — 86140 C-REACTIVE PROTEIN: CPT

## 2022-11-20 PROCEDURE — 36415 COLL VENOUS BLD VENIPUNCTURE: CPT

## 2022-11-20 RX ORDER — ACETAMINOPHEN 650 MG/1
650 SUPPOSITORY RECTAL EVERY 6 HOURS PRN
Status: DISCONTINUED | OUTPATIENT
Start: 2022-11-20 | End: 2022-11-22 | Stop reason: HOSPADM

## 2022-11-20 RX ORDER — POLYETHYLENE GLYCOL 3350 17 G/17G
17 POWDER, FOR SOLUTION ORAL DAILY PRN
Status: DISCONTINUED | OUTPATIENT
Start: 2022-11-20 | End: 2022-11-22 | Stop reason: HOSPADM

## 2022-11-20 RX ORDER — SODIUM CHLORIDE 0.9 % (FLUSH) 0.9 %
5-40 SYRINGE (ML) INJECTION PRN
Status: DISCONTINUED | OUTPATIENT
Start: 2022-11-20 | End: 2022-11-22 | Stop reason: HOSPADM

## 2022-11-20 RX ORDER — ONDANSETRON 4 MG/1
4 TABLET, ORALLY DISINTEGRATING ORAL EVERY 8 HOURS PRN
Status: DISCONTINUED | OUTPATIENT
Start: 2022-11-20 | End: 2022-11-22 | Stop reason: HOSPADM

## 2022-11-20 RX ORDER — ESCITALOPRAM OXALATE 10 MG/1
10 TABLET ORAL DAILY
Status: DISCONTINUED | OUTPATIENT
Start: 2022-11-20 | End: 2022-11-22 | Stop reason: HOSPADM

## 2022-11-20 RX ORDER — ENOXAPARIN SODIUM 100 MG/ML
40 INJECTION SUBCUTANEOUS DAILY
Status: DISCONTINUED | OUTPATIENT
Start: 2022-11-20 | End: 2022-11-22 | Stop reason: HOSPADM

## 2022-11-20 RX ORDER — SODIUM CHLORIDE 9 MG/ML
INJECTION, SOLUTION INTRAVENOUS PRN
Status: DISCONTINUED | OUTPATIENT
Start: 2022-11-20 | End: 2022-11-22 | Stop reason: HOSPADM

## 2022-11-20 RX ORDER — ACETAMINOPHEN 325 MG/1
650 TABLET ORAL ONCE
Status: COMPLETED | OUTPATIENT
Start: 2022-11-20 | End: 2022-11-20

## 2022-11-20 RX ORDER — ACETAMINOPHEN 325 MG/1
650 TABLET ORAL EVERY 6 HOURS PRN
Status: DISCONTINUED | OUTPATIENT
Start: 2022-11-20 | End: 2022-11-22 | Stop reason: HOSPADM

## 2022-11-20 RX ORDER — PANTOPRAZOLE SODIUM 40 MG/1
40 TABLET, DELAYED RELEASE ORAL
Status: DISCONTINUED | OUTPATIENT
Start: 2022-11-20 | End: 2022-11-22

## 2022-11-20 RX ORDER — DEXAMETHASONE 4 MG/1
6 TABLET ORAL DAILY
Status: DISCONTINUED | OUTPATIENT
Start: 2022-11-20 | End: 2022-11-21

## 2022-11-20 RX ORDER — ALBUTEROL SULFATE 90 UG/1
2 AEROSOL, METERED RESPIRATORY (INHALATION) EVERY 6 HOURS PRN
Status: DISCONTINUED | OUTPATIENT
Start: 2022-11-20 | End: 2022-11-22 | Stop reason: HOSPADM

## 2022-11-20 RX ORDER — SODIUM CHLORIDE 9 MG/ML
INJECTION, SOLUTION INTRAVENOUS CONTINUOUS
Status: DISCONTINUED | OUTPATIENT
Start: 2022-11-20 | End: 2022-11-22 | Stop reason: HOSPADM

## 2022-11-20 RX ORDER — GUAIFENESIN/DEXTROMETHORPHAN 100-10MG/5
5 SYRUP ORAL EVERY 4 HOURS PRN
Status: DISCONTINUED | OUTPATIENT
Start: 2022-11-20 | End: 2022-11-22 | Stop reason: HOSPADM

## 2022-11-20 RX ORDER — OXYCODONE HYDROCHLORIDE 5 MG/1
5 TABLET ORAL ONCE
Status: COMPLETED | OUTPATIENT
Start: 2022-11-20 | End: 2022-11-20

## 2022-11-20 RX ORDER — ONDANSETRON 2 MG/ML
4 INJECTION INTRAMUSCULAR; INTRAVENOUS EVERY 6 HOURS PRN
Status: DISCONTINUED | OUTPATIENT
Start: 2022-11-20 | End: 2022-11-22 | Stop reason: HOSPADM

## 2022-11-20 RX ORDER — DEXAMETHASONE 4 MG/1
6 TABLET ORAL DAILY
Status: DISCONTINUED | OUTPATIENT
Start: 2022-11-21 | End: 2022-11-20

## 2022-11-20 RX ORDER — METOPROLOL TARTRATE 50 MG/1
1 TABLET, FILM COATED ORAL 2 TIMES DAILY
Status: CANCELLED | OUTPATIENT
Start: 2022-11-20

## 2022-11-20 RX ORDER — IBUPROFEN 400 MG/1
400 TABLET ORAL EVERY 6 HOURS PRN
Status: DISPENSED | OUTPATIENT
Start: 2022-11-20 | End: 2022-11-22

## 2022-11-20 RX ORDER — METOPROLOL TARTRATE 50 MG/1
50 TABLET, FILM COATED ORAL 2 TIMES DAILY
Status: DISCONTINUED | OUTPATIENT
Start: 2022-11-20 | End: 2022-11-22 | Stop reason: HOSPADM

## 2022-11-20 RX ADMIN — PHENOL 1 SPRAY: 1.4 LIQUID ORAL at 21:17

## 2022-11-20 RX ADMIN — DEXAMETHASONE 6 MG: 4 TABLET ORAL at 09:43

## 2022-11-20 RX ADMIN — ACETAMINOPHEN 650 MG: 325 TABLET ORAL at 04:50

## 2022-11-20 RX ADMIN — SODIUM CHLORIDE, PRESERVATIVE FREE 5 ML: 5 INJECTION INTRAVENOUS at 04:57

## 2022-11-20 RX ADMIN — SODIUM CHLORIDE 500 ML: 9 INJECTION, SOLUTION INTRAVENOUS at 00:14

## 2022-11-20 RX ADMIN — KETOROLAC TROMETHAMINE 15 MG: 30 INJECTION, SOLUTION INTRAMUSCULAR; INTRAVENOUS at 00:15

## 2022-11-20 RX ADMIN — SODIUM CHLORIDE: 9 INJECTION, SOLUTION INTRAVENOUS at 04:54

## 2022-11-20 RX ADMIN — PANTOPRAZOLE SODIUM 40 MG: 40 TABLET, DELAYED RELEASE ORAL at 04:56

## 2022-11-20 RX ADMIN — GUAIFENESIN AND DEXTROMETHORPHAN 5 ML: 100; 10 SYRUP ORAL at 15:44

## 2022-11-20 RX ADMIN — SODIUM CHLORIDE: 9 INJECTION, SOLUTION INTRAVENOUS at 15:46

## 2022-11-20 RX ADMIN — METOPROLOL TARTRATE 50 MG: 50 TABLET, FILM COATED ORAL at 09:44

## 2022-11-20 RX ADMIN — METOPROLOL TARTRATE 50 MG: 50 TABLET, FILM COATED ORAL at 21:16

## 2022-11-20 RX ADMIN — OXYCODONE HYDROCHLORIDE 5 MG: 5 TABLET ORAL at 21:16

## 2022-11-20 RX ADMIN — ESCITALOPRAM OXALATE 10 MG: 10 TABLET ORAL at 09:44

## 2022-11-20 RX ADMIN — ENOXAPARIN SODIUM 40 MG: 100 INJECTION SUBCUTANEOUS at 09:44

## 2022-11-20 RX ADMIN — Medication 1 LOZENGE: at 21:17

## 2022-11-20 RX ADMIN — IBUPROFEN 400 MG: 400 TABLET, FILM COATED ORAL at 15:44

## 2022-11-20 RX ADMIN — ACETAMINOPHEN 650 MG: 325 TABLET ORAL at 04:53

## 2022-11-20 ASSESSMENT — PAIN SCALES - GENERAL
PAINLEVEL_OUTOF10: 7
PAINLEVEL_OUTOF10: 8
PAINLEVEL_OUTOF10: 8
PAINLEVEL_OUTOF10: 9
PAINLEVEL_OUTOF10: 7
PAINLEVEL_OUTOF10: 7

## 2022-11-20 ASSESSMENT — PAIN DESCRIPTION - DESCRIPTORS
DESCRIPTORS: ACHING

## 2022-11-20 ASSESSMENT — PAIN DESCRIPTION - ORIENTATION: ORIENTATION: MID

## 2022-11-20 ASSESSMENT — PAIN DESCRIPTION - LOCATION
LOCATION: THROAT;GENERALIZED
LOCATION: GENERALIZED
LOCATION: THROAT
LOCATION: GENERALIZED
LOCATION: THROAT

## 2022-11-20 ASSESSMENT — PAIN DESCRIPTION - PAIN TYPE
TYPE: ACUTE PAIN
TYPE: ACUTE PAIN

## 2022-11-20 ASSESSMENT — PAIN DESCRIPTION - FREQUENCY
FREQUENCY: CONTINUOUS
FREQUENCY: CONTINUOUS

## 2022-11-20 ASSESSMENT — PAIN DESCRIPTION - ONSET
ONSET: ON-GOING
ONSET: ON-GOING

## 2022-11-20 ASSESSMENT — ENCOUNTER SYMPTOMS
SHORTNESS OF BREATH: 1
SORE THROAT: 1
EYES NEGATIVE: 1
NAUSEA: 1

## 2022-11-20 ASSESSMENT — LIFESTYLE VARIABLES
HOW OFTEN DO YOU HAVE A DRINK CONTAINING ALCOHOL: MONTHLY OR LESS
HOW MANY STANDARD DRINKS CONTAINING ALCOHOL DO YOU HAVE ON A TYPICAL DAY: 1 OR 2

## 2022-11-20 ASSESSMENT — PAIN - FUNCTIONAL ASSESSMENT: PAIN_FUNCTIONAL_ASSESSMENT: ACTIVITIES ARE NOT PREVENTED

## 2022-11-20 NOTE — ED PROVIDER NOTES
Triage Chief Complaint:   Loss of Consciousness (At urgent care and had syncopal episode. Flu like symptoms. Hx SVT, not eating and drinking well)    [place]:  Today in the ED I had the pleasure of caring for Dannielle Gannon who is a 50 y.o. female that presents today to the ED for evaluation. For syncope. Patient was at urgent care today being seen for illness. She felt very ill and weak and lightheaded. When they called her name she stood up and started walking towards the room and got profoundly lightheaded and that passing out did not fall or hit her head. She is accompanied by family members here in the ED. Pt states over the past several days she has been quite ill with nauea vomitting. Feels dehydrated. Patient has been feeling this way over the last several days. She endorses mild headache tightness in the throat as well. Feels like she cannot swallow secondary to throat closing and tightness. She is never had this sensation before no significant allergies. The pain does travel down into the lower neck. However no overt chest pain no palpitations. She endorses chest congestion however no shortness of breath no history of DVT or PE. No history of hyperlipidemia hypertension diabetes, cocaine abuse or tobacco abuse. Patient denies sensation of palpitations during syncopal episode. ROS:  REVIEW OF SYSTEMS    At least 10 systems reviewed      All other review of systems are negative  See HPI and nursing notes for additional information       Past Medical History:   Diagnosis Date    GERD (gastroesophageal reflux disease)     SVT (supraventricular tachycardia) (HCC)     Takes Metoprolol - previously saw Dr. Maria C Rg, now PCP     Past Surgical History:   Procedure Laterality Date    DILATION AND CURETTAGE OF UTERUS N/A 3/31/2021    DILATATION AND CURETTAGE HYSTEROSCOPY performed by Mode Simon DO at [facility]  2005     History reviewed.  No pertinent family history. Social History     Socioeconomic History    Marital status:      Spouse name: Not on file    Number of children: Not on file    Years of education: Not on file    Highest education level: Not on file   Occupational History    Not on file   Tobacco Use    Smoking status: Never    Smokeless tobacco: Never   Vaping Use    Vaping Use: Never used   Substance and Sexual Activity    Alcohol use: Yes     Comment: 3-4 drinks a month    Drug use: No    Sexual activity: Not on file   Other Topics Concern    Not on file   Social History Narrative    Not on file     Social Determinants of Health     Financial Resource Strain: Not on file   Food Insecurity: Not on file   Transportation Needs: Not on file   Physical Activity: Not on file   Stress: Not on file   Social Connections: Not on file   Intimate Partner Violence: Not on file   Housing Stability: Not on file     Current Facility-Administered Medications   Medication Dose Route Frequency Provider Last Rate Last Admin    0.9 % sodium chloride bolus  1,000 mL IntraVENous Once Starling Jess Tapia, DO 1,000 mL/hr at 11/19/22 1841 1,000 mL at 11/19/22 1841     Current Outpatient Medications   Medication Sig Dispense Refill    SYRINGE-NEEDLE, DISP, 3 ML (B-D 3CC LUER-BIBIANA SYR 25GX1\") 25G X 1\" 3 ML MISC USE INITIALLY THREE TIMES A WEEK FOR VITAMIN B12 (CYANOCOBALAMIN) INJECTION DOSING, THEN EVENTUALLY TITRATE TO ONCE A MONTH DOSING AS DIRECTED. 10 each 0    cyanocobalamin 1000 MCG/ML injection INJECT 1ML AS DIRECTED THREE TIMES A WEEK FOR 2 WEEKS, THEN TWICE A WEEK FOR 2 WEEKS, THEN ONCE A WEEK FOR 3 MONTHS, THEN ONCE A MONTH THEREAFTER. 10 mL 0    metoprolol tartrate (LOPRESSOR) 50 MG tablet TAKE ONE TABLET BY MOUTH TWICE A DAY 60 tablet 2    escitalopram (LEXAPRO) 20 MG tablet Take 0.5 tablets by mouth in the morning. 30 tablet 2    omeprazole (PRILOSEC) 20 MG delayed release capsule Take 1 capsule by mouth in the morning.  30 capsule 2     No Known Allergies    Nursing Notes Reviewed    Physical Exam:  ED Triage Vitals   Enc Vitals Group      BP 11/19/22 1650 (!) 144/91      Heart Rate 11/19/22 1650 (!) 114      Resp 11/19/22 1650 18      Temp 11/19/22 1708 99.2 °F (37.3 °C)      Temp Source 11/19/22 1708 Oral      SpO2 11/19/22 1650 95 %      Weight 11/19/22 1650 195 lb (88.5 kg)      Height 11/19/22 1650 5' 6\" (1.676 m)      Head Circumference --       Peak Flow --       Pain Score --       Pain Loc --       Pain Edu? --       Excl. in [address]? --      General :Patient is awake alert oriented person place and time no acute distress nontoxic appearing  HEENT: Pupils are equally round and reactive to light extraocular motors are intact conjunctivae clear sclerae white there is no injection no icterus. Nose without any rhinorrhea or epistaxis. Oral mucosa is moist no exudate buccal mucosa shows no ulcerations. Uvula is midline    Neck: Neck is supple full range of motion trachea midline thyroid nonpalpable  Cardiac: Heart regular rate rhythm no murmurs rubs clicks or gallops  Lungs: Lungs are clear to auscultation there is no wheezing rhonchi or rales. There is no use of accessory muscles no nasal flaring identified. Chest wall: There is no tenderness to palpation over the chest wall or over ribs  Abdomen: Abdomen is soft nontender nondistended. There is no firm or pulsatile masses no rebound rigidity or guarding negative Barraaz's negative McBurney, no peritoneal signs  Suprapubic:  there is no tenderness to palpation over the external bladder   Musculoskeletal: 5 out of 5 strength in all 4 extremities full flexion extension abduction and adduction supination pronation of all extremities and all digits. No obvious muscle atrophy is noted.  No focal muscle deficits are appreciated  Dermatology: Skin is warm and dry there is no obvious abscesses lacerations or lesions noted  Psych: Mentation is grossly normal cognition is grossly normal. Affect is appropriate  Neuro: A&Ox4. GCS 15. Cranial nerves 2-12 grossly intact, PEERLA, EOMs intact, Short and long term memory intact, Pt shows good judgement, follows command well, carries on logical conversation. No ataxia with finger to nose.  strength full bilateral.  UE, LE, Facial sensation full and equal bilateral, no cerebellar dysfunction, negative motor drift. Bicep, Triceps,  strength full and symmetrical. LE DTRs full and symmetrical. Sharp and dull sensation in distal extremities present.         I have reviewed and interpreted all of the currently available lab results from this visit (if applicable):  Results for orders placed or performed during the hospital encounter of 11/19/22   CBC with Auto Differential   Result Value Ref Range    WBC 7.1 4.0 - 10.5 K/CU MM    RBC 5.20 4.2 - 5.4 M/CU MM    Hemoglobin 12.6 12.5 - 16.0 GM/DL    Hematocrit 39.5 37 - 47 %    MCV 76.0 (L) 78 - 100 FL    MCH 24.2 (L) 27 - 31 PG    MCHC 31.9 (L) 32.0 - 36.0 %    RDW 15.7 (H) 11.7 - 14.9 %    Platelets 159 046 - 801 K/CU MM    MPV 11.8 (H) 7.5 - 11.1 FL    Differential Type AUTOMATED DIFFERENTIAL     Segs Relative 69.8 (H) 36 - 66 %    Lymphocytes % 18.6 (L) 24 - 44 %    Monocytes % 9.8 (H) 0 - 4 %    Eosinophils % 1.1 0 - 3 %    Basophils % 0.6 0 - 1 %    Segs Absolute 4.9 K/CU MM    Lymphocytes Absolute 1.3 K/CU MM    Monocytes Absolute 0.7 K/CU MM    Eosinophils Absolute 0.1 K/CU MM    Basophils Absolute 0.0 K/CU MM    Nucleated RBC % 0.0 %    Total Nucleated RBC 0.0 K/CU MM    Total Immature Neutrophil 0.01 K/CU MM    Immature Neutrophil % 0.1 0 - 0.43 %   Comprehensive Metabolic Panel w/ Reflex to MG   Result Value Ref Range    Sodium 131 (L) 135 - 145 MMOL/L    Potassium 3.7 3.5 - 5.1 MMOL/L    Chloride 95 (L) 99 - 110 mMol/L    CO2 24 21 - 32 MMOL/L    BUN 7 6 - 23 MG/DL    Creatinine 0.5 (L) 0.6 - 1.1 MG/DL    Est, Glom Filt Rate >60 >60 mL/min/1.73m2    Glucose 227 (H) 70 - 99 MG/DL    Calcium 9.4 8.3 - 10.6 MG/DL    Albumin 4.0 3.4 - 5.0 GM/DL    Total Protein 7.3 6.4 - 8.2 GM/DL    Total Bilirubin 0.4 0.0 - 1.0 MG/DL    ALT 18 10 - 40 U/L    AST 26 15 - 37 IU/L    Alkaline Phosphatase 121 40 - 129 IU/L    Anion Gap 12 4 - 16   Troponin   Result Value Ref Range    Troponin T <0.010 <0.01 NG/ML   Brain Natriuretic Peptide   Result Value Ref Range    Pro-BNP 30.95 <300 PG/ML   EKG 12 Lead   Result Value Ref Range    Ventricular Rate 100 BPM    Atrial Rate 100 BPM    P-R Interval 126 ms    QRS Duration 66 ms    Q-T Interval 308 ms    QTc Calculation (Bazett) 397 ms    P Axis 43 degrees    R Axis 22 degrees    T Axis 17 degrees    Diagnosis       Normal sinus rhythm  Nonspecific T wave abnormality  Abnormal ECG  No previous ECGs available        Radiographs (if obtained):  [] The following radiograph was interpreted by myself in the absence of a radiologist:   [] Radiologist's Report Reviewed:  XR CHEST PORTABLE   Final Result   No radiographic evidence of an acute cardiopulmonary process. EKG (if obtained):   Please See Note of attending physician for EKG interpretation. Chart review shows recent radiograph(s):  XR CHEST PORTABLE    Result Date: 11/19/2022  EXAMINATION: ONE XRAY VIEW OF THE CHEST 11/19/2022 4:07 pm COMPARISON: 02/08/2015. HISTORY: ORDERING SYSTEM PROVIDED HISTORY: syncope TECHNOLOGIST PROVIDED HISTORY: Reason for exam:->syncope Reason for Exam: syncope FINDINGS: The lungs and costophrenic angles are clear. The cardiomediastinal silhouette and pulmonary vessels appear normal.     No radiographic evidence of an acute cardiopulmonary process. MDM:     Interventions given this visit:   Orders Placed This Encounter   Medications    0.9 % sodium chloride bolus    DISCONTD: acetaminophen (TYLENOL) tablet 650 mg    acetaminophen (TYLENOL) suspension 650 mg    ondansetron (ZOFRAN) injection 4 mg     Presents today to the emergency department with episode of syncope. Lightheadedness. She did have a lightheaded prodrome. There just after standing up. She has been ill. She is COVID-positive. Likely etiology of patient's symptoms is COVID-positive plus a little bit of dehydration as well as orthostasis. She has had decreased oral intake. IV fluids are initiated antiemetics as well as antipyretics are provided here in the emergency department. Work-up down here is essentially unremarkable for any acute processes aside from nitrite positive urine. COVID positive negative CT scan of the head. Patient endorses sensation of tight throat. CT soft tissue of the neck is negative. Airway is patent on physical examination CT angiogram of the lungs also revealed no evidence of pulmonary embolism. CTA was ordered secondary to patient's continued tachycardia. I independently managed patient today in the ED.     BP (!) 151/87   Pulse (!) 103   Temp 99.2 °F (37.3 °C) (Oral)   Resp 17   Ht 5' 6\" (1.676 m)   Wt 195 lb (88.5 kg)   SpO2 93%   BMI 31.47 kg/m²       Clinical Impression:  1. Syncope and collapse    2. COVID-19    3. Hypoxia          Disposition referral (if applicable):  No follow-up provider specified. Disposition medications (if applicable):  New Prescriptions    No medications on file         Comment: Please note this report has been produced using speech recognition software and may contain errors related to that system including errors in grammar, punctuation, and spelling, as well as words and phrases that may be inappropriate. If there are any questions or concerns please feel free to contact the dictating provider for clarification.       Etelvina Moran, [address] [place] [address]  11/30/22 [id number]

## 2022-11-20 NOTE — H&P
History and Physical 22        NAME: Gus Kasper  : 1970  MRN: [id number]      Assessment/Plan:  Gus Kasper is a 50 y.o. female with a history of SVT and depression who presented to [facility] 2022 from urgent care post syncopal episode. Found to be COVID positive and hypoxic to the 80s. #Acute hypoxic resp failure  #COVID-19  --Supplemental O2 as needed to maintain O2>94%  --Continue decadron at this time, 6mg PO daily  --Will hold on Baricitinib at this time, can consider if patient decompensates  --Isolation precautions  --Follow up inflammatory markers  --Albuterol as needed  --Admit to COVID unit    #Syncopal episode  --Likely 2/2 to COVID infection, and decreased PO intake, however patient has a history of SVT, monitor on tele  --Continue metoprolol 50mg BID  --Will start on IVF   --Will monitor on tele, obtain EKG and follow up ECHO  --Hold on cardio consult at this time given high suspicion for syncopal episode being non-cardiac    #H/O SVT  --Continue metoprolol 50mg BID with holding parameters    #H/O depression  --Continue Escitalopram    DVT Prophylaxis: Lovenox  Code Status/Surrogate Decision Maker: Full      Current living situation: Home  Expected Disposition: Home  Estimated discharge date: 1-2 days      Chief Complaint:    Presenting post syncopal episode at urgent care    History of Present Illness:    Patient is a 61-year-old with past medical history of depression and SVT who presented to the urgent care center earlier in the day due to worsening sore throat, fatigue and malaise. While there patient was noted to have a syncopal episode and transferred to the ED for further evaluation. On presentation she was hypertensive, tachycardic, afebrile, with a respiratory rate of 18 and saturating appropriately on room air. Level while in the ED saturations were noted to drop to the low 80s, requiring oxygen with subsequent improvement.  Labs are notable for sodium of 131, creatinine of 0.5, blood glucose of 227, proBNP was 30, initial troponin is negative. LFTs unremarkable, H&H of 12.6/39.5 with an MCV of 76. COVID-positive. CT head was negative for any acute intracranial findings, there was some mild leukoencephalopathy which could reflect early microvascular ischemic changes as well as mild mucosal thickening involving the maxillary and ethmoid sinuses. CT of the neck with contrast was negative for any acute abnormalities of the soft tissue structures CTA pulmonary with contrast showed no convincing evidence for pulmonary arterial embolism, there was some minimal atelectatic changes in the lower lungs as well as calcified lymph nodes at the mediastinum and right hilum. There was a 3 mm less than 2 mm area of pleural placed nodularity along the margin of the middle lobe. Patient is status post 2.5L bolus in the ED and 10mg of decadron IV. On encounter, she is sitting in bed, appears ill and very tired, daughter is at bedside. She endorses that post the steroid shot her throat feels much better and that she feels she may be able to eat something as she has been unable to eat for the past day. She is being admitted for acute respiratory failure in the setting of COVID-19 associated with a syncopal episode. ROS:    Review of Systems   Constitutional:  Positive for appetite change and fatigue. HENT:  Positive for sore throat. Eyes: Negative. Respiratory:  Positive for shortness of breath. Cardiovascular: Negative. Gastrointestinal:  Positive for nausea. Genitourinary: Negative. Musculoskeletal: Negative. Skin: Negative. Neurological: Negative. Psychiatric/Behavioral: Negative.         Past Medical, Surgical, Social, Family History:   Past Medical History:   Diagnosis Date    GERD (gastroesophageal reflux disease)     SVT (supraventricular tachycardia) (HCC)     Takes Metoprolol - previously saw Dr. Dl Burden, now PCP     Past Surgical History:   Procedure Laterality Date    DILATION AND CURETTAGE OF UTERUS N/A 3/31/2021    DILATATION AND CURETTAGE HYSTEROSCOPY performed by Mode Simon DO at [place] [address]  2005     Social History     Socioeconomic History    Marital status:      Spouse name: Not on file    Number of children: Not on file    Years of education: Not on file    Highest education level: Not on file   Occupational History    Not on file   Tobacco Use    Smoking status: Never    Smokeless tobacco: Never   Vaping Use    Vaping Use: Never used   Substance and Sexual Activity    Alcohol use: Yes     Comment: 3-4 drinks a month    Drug use: No    Sexual activity: Not on file   Other Topics Concern    Not on file   Social History Narrative    Not on file     Social Determinants of Health     Financial Resource Strain: Not on file   Food Insecurity: Not on file   Transportation Needs: Not on file   Physical Activity: Not on file   Stress: Not on file   Social Connections: Not on file   Intimate Partner Violence: Not on file   Housing Stability: Not on file     History reviewed. No pertinent family history. Home Medications:  Prior to Admission medications    Medication Sig Start Date End Date Taking? Authorizing Provider   SYRINGE-NEEDLE, DISP, 3 ML (B-D 3CC LUER-BIBIANA SYR 25GX1\") 25G X 1\" 3 ML MISC USE INITIALLY THREE TIMES A WEEK FOR VITAMIN B12 (CYANOCOBALAMIN) INJECTION DOSING, THEN EVENTUALLY TITRATE TO ONCE A MONTH DOSING AS DIRECTED. 8/10/22   Lucas Daniel MD   cyanocobalamin 1000 MCG/ML injection INJECT 1ML AS DIRECTED THREE TIMES A WEEK FOR 2 WEEKS, THEN TWICE A WEEK FOR 2 WEEKS, THEN ONCE A WEEK FOR 3 MONTHS, THEN ONCE A MONTH THEREAFTER. 8/10/22   Lucas Daniel MD   metoprolol tartrate (LOPRESSOR) 50 MG tablet TAKE ONE TABLET BY MOUTH TWICE A DAY 8/3/22   Lucas Daniel MD   escitalopram (LEXAPRO) 20 MG tablet Take 0.5 tablets by mouth in the morning.  8/3/22   Lucas Daniel MD omeprazole (PRILOSEC) 20 MG delayed release capsule Take 1 capsule by mouth in the morning. 8/3/22   Franky Rowe MD         Physical Exam:   BP (!) 113/58   Pulse 76   Temp 97.9 °F (36.6 °C) (Oral)   Resp 13   Ht 5' 6\" (1.676 m)   Wt 195 lb (88.5 kg)   SpO2 99%   BMI 31.47 kg/m²       General: Appears ill and very fatigued  Eyes: EOMI  HENT: NCAT, dry mucous membranes  Cardiovascular: Tachycardic. Respiratory: Clear to auscultation  Gastrointestinal: Soft, non tender  Genitourinary: no suprapubic tenderness  Musculoskeletal: No edema  Skin: warm, dry  Neuro: Alert. Psych: Mood appropriate. Labs, Imaging, and Studies reviewed:    CT HEAD WO CONTRAST    Result Date: 11/19/2022  EXAMINATION: CT OF THE HEAD WITHOUT CONTRAST  11/19/2022 6:46 pm TECHNIQUE: CT of the head was performed without the administration of intravenous contrast. Automated exposure control, iterative reconstruction, and/or weight based adjustment of the mA/kV was utilized to reduce the radiation dose to as low as reasonably achievable. COMPARISON: None. HISTORY: ORDERING SYSTEM PROVIDED HISTORY: syncope TECHNOLOGIST PROVIDED HISTORY: Has a \"code stroke\" or \"stroke alert\" been called? ->No Reason for exam:->syncope Decision Support Exception - unselect if not a suspected or confirmed emergency medical condition->Emergency Medical Condition (MA) Is the patient pregnant?->No Reason for Exam: Loss of Consciousness FINDINGS: BRAIN/VENTRICLES: There is no acute intracranial hemorrhage, mass effect or midline shift. No abnormal extra-axial fluid collection. The gray-white differentiation is maintained without evidence of an acute infarct. There is no evidence of hydrocephalus. There are multifocal areas of white matter hypoattenuation which are mild. ORBITS: The visualized portion of the orbits demonstrate no acute abnormality. SINUSES: There is mucosal thickening in the ethmoid and bilateral maxillary sinuses.   The other paranasal sinuses and the mastoid air cells are normally aerated. SOFT TISSUES/SKULL:  No acute abnormality of the visualized skull or soft tissues. 1. No acute intracranial findings. 2. Nonspecific mild leukoencephalopathy which could reflect early microvascular ischemic changes, but the myelination is a possibility. 3. Mild mucosal thickening involving the maxillary and ethmoid sinuses. CT SOFT TISSUE NECK W CONTRAST    Result Date: 11/19/2022  EXAMINATION: CT OF THE NECK SOFT TISSUE WITH CONTRAST  11/19/2022 TECHNIQUE: CT of the neck was performed with the administration of intravenous contrast. Multiplanar reformatted images are provided for review. Automated exposure control, iterative reconstruction, and/or weight based adjustment of the mA/kV was utilized to reduce the radiation dose to as low as reasonably achievable. COMPARISON: None. HISTORY: ORDERING SYSTEM PROVIDED HISTORY: swelling sensation TECHNOLOGIST PROVIDED HISTORY: Reason for exam:->swelling sensation Decision Support Exception - unselect if not a suspected or confirmed emergency medical condition->Emergency Medical Condition (MA) Reason for Exam: swelling sensation FINDINGS: PHARYNX/LARYNX:  The palatine tonsils are normal in appearance. The tongue is normal in appearance. The valleculae, epiglottis, aryepiglottic folds and pyriform sinuses appear unremarkable. The true and false vocal cords are normal in appearance. No mass or abscess is seen. SALIVARY GLANDS/THYROID:  The parotid and submandibular glands appear unremarkable. The thyroid gland appears unremarkable. LYMPH NODES:  No cervical or supraclavicular lymphadenopathy is seen. SOFT TISSUES:  No appreciable soft tissue swelling or mass is seen. BRAIN/ORBITS/SINUSES:  The visualized portion of the intracranial contents appear unremarkable. The visualized portion of the orbits, paranasal sinuses and mastoid air cells demonstrate no acute abnormality.  LUNG APICES/SUPERIOR MEDIASTINUM: No focal consolidation is seen within the visualized lung apices. No superior mediastinal lymphadenopathy or mass. The visualized portion of the trachea appears unremarkable. BONES:  No aggressive appearing lytic or blastic bony lesion. No acute abnormality of the soft tissue structures of the neck. XR CHEST PORTABLE    Result Date: 11/19/2022  EXAMINATION: ONE XRAY VIEW OF THE CHEST 11/19/2022 4:07 pm COMPARISON: 02/08/2015. HISTORY: ORDERING SYSTEM PROVIDED HISTORY: syncope TECHNOLOGIST PROVIDED HISTORY: Reason for exam:->syncope Reason for Exam: syncope FINDINGS: The lungs and costophrenic angles are clear. The cardiomediastinal silhouette and pulmonary vessels appear normal.     No radiographic evidence of an acute cardiopulmonary process. CTA PULMONARY W CONTRAST    Result Date: 11/20/2022  EXAMINATION: CTA OF THE CHEST 11/19/2022 9:52 pm TECHNIQUE: CTA of the chest was performed after the administration of intravenous contrast.  Multiplanar reformatted images are provided for review. MIP images are provided for review. Automated exposure control, iterative reconstruction, and/or weight based adjustment of the mA/kV was utilized to reduce the radiation dose to as low as reasonably achievable. COMPARISON: None. HISTORY: ORDERING SYSTEM PROVIDED HISTORY: sob TECHNOLOGIST PROVIDED HISTORY: Reason for exam:->sob Decision Support Exception - unselect if not a suspected or confirmed emergency medical condition->Emergency Medical Condition (MA) Reason for Exam: sepsis FINDINGS: Pulmonary Arteries: Bolus timing is good but patient has respiratory motion artifact. Adjusting for the respiratory motion, no convincing evidence of pulmonary arterial embolism. The sensitivity in the distal lower lobe branches is decreased. Mediastinum: No thoracic aortic aneurysm or sign of dissection. Mild left ventricular hypertrophy is suggested. There is no pericardial effusion or mediastinal hematoma.   Mediastinal lymph nodes are not enlarged by short axis. Calcified subcarinal and right hilar lymph nodes are present. Lungs/pleura: Respiratory motion does degrade the fine interstitial details and assessment for tiny nodules. Mild dependent atelectasis in the deep lung bases but without significant pleural effusion and no pneumothorax. There is a 3 mm area of pleural based nodularity along the middle lobe on series 3, image number 57. A less than 2 mm area of thickening along the anterior pleural at the middle lobe on series 3, image number 61. Upper Abdomen: Limited images of the upper abdomen are unremarkable. Soft Tissues/Bones: No acute fractures are seen at the ribs. No collapse, subluxation, or focal deformity at the thoracic spine. Early facet arthropathy is present. 1.  Respiratory motion artifact decreases sensitivity in the distal lower lobe branches. However no convincing evidence for pulmonary arterial embolism. 2.  Minimal atelectatic changes in the lower lungs and has calcified lymph nodes at the mediastinum and right hilum. 3.  A 3 mm and less than 2 mm area of pleural based nodularity along the margin of the middle lobe would not require further workup in a low risk patient. Most likely related to the old granulomatous disease given the calcified lymph nodes. Fleischner Society guidelines for follow-up and management of incidentally detected pulmonary nodules: Multiple Solid Nodules: Nodule size less than 6 mm In a low-risk patient, no routine follow-up. In a high-risk patient, optional CT at 12 months. - Low risk patients include individuals with minimal or absent history of smoking and other known risk factors. - High risk patients include individuals with a history or smoking or known risk factors. Radiology 2017 http://pubs. rsna.org/doi/full/10.1148/radiol. 6275025467       CBC:   Recent Labs     11/19/22  1700   WBC 7.1   HGB 12.6        BMP:    Recent Labs     11/19/22  1700   * K 3.7   CL 95*   CO2 24   BUN 7   CREATININE 0.5*   GLUCOSE 227*     Hepatic:   Recent Labs     11/19/22  1700   AST 26   ALT 18   BILITOT 0.4   ALKPHOS 121     Lipids: No results found for: CHOL, HDL, TRIG  Hemoglobin A1C: No results found for: LABA1C  TSH: No results found for: TSH  Troponin:   Lab Results   Component Value Date/Time    TROPONINT <0.010 11/19/2022 05:00 PM    TROPONINT <0.010 02/08/2015 02:47 PM     Lactic Acid: No results for input(s): LACTA in the last 72 hours.   BNP:   Recent Labs     11/19/22 1700   PROBNP 30.95     UA:No results found for: Felipe Aj, PHUR, LABCAST, WBCUA, RBCUA, MUCUS, TRICHOMONAS, YEAST, BACTERIA, CLARITYU, SPECGRAV, LEUKOCYTESUR, UROBILINOGEN, BILIRUBINUR, BLOODU, GLUCOSEU, KETUA, AMORPHOUS  Urine Cultures: No results found for: LABURIN  Blood Cultures: No results found for: BC  No results found for: BLOODCULT2  Organism: No results found for: [facility] [place]          Electronically signed by Eduarda Charles MD on 11/20/2022 at 1:23 AM

## 2022-11-20 NOTE — ED PROVIDER NOTES
Sinus rhythm at 100. Normal axis with good R wave progression. Nonspecific T wave changes without ST elevation or depression. No prior EKG available for comparison.         Natacha Henry,   11/19/22 2301

## 2022-11-20 NOTE — PROGRESS NOTES
Patient seen at bedside. 53-year-old female admitted with acute hypoxic respiratory failure secondary to COVID. As per patient she was noted to be hypoxic at an urgent care center. Patient has not required oxygen support since admission. Unclear if she required any oxygen in ED. Patient started on Decadron treatment. Patient seen and evaluated at bedside. Patient complains of sinus pressure and congestion and cough. I feel this is more likely consistent with viral etiology. Patient also complains of difficulty swallowing due to ' swelling of throat'. Patient complains of difficulty taking pills/food. Denies any allergy. Patient does not have hives or any hallmark of allergic reaction. Patient has been hemodynamically stable since admission. Will get bedside swallow eval for evaluation of safe swallowing. Will treat with Robitussin for cough. I suspect patient has asymptomatic COVID with URI from another viral etiology (influenza or RSV or rhinovirus). We will send for viral panel to evaluate for alternate etiology of URI. Continue symptomatic management.

## 2022-11-20 NOTE — ED NOTES
ED TO INPATIENT SBAR HANDOFF    Patient Name: Armadn Dowell   :  1970  50 y.o. MRN:  [id number]  Preferred Name     ED Room #:  [facility]  Family/Caregiver Present yes   Restraints no   Sitter no   Sepsis Risk Score Sepsis Risk Score: 0.54    Situation  Code Status: No Order No additional code details. Allergies: Patient has no known allergies. Weight: Patient Vitals for the past 96 hrs (Last 3 readings):   Weight   22 1650 195 lb (88.5 kg)     Arrived from: home  Chief Complaint:   Chief Complaint   Patient presents with    Loss of Consciousness     At urgent care and had syncopal episode. Flu like symptoms. Hx SVT, not eating and drinking well     Hospital Problem/Diagnosis:  Active Problems:    * No active hospital problems. *  Resolved Problems:    * No resolved hospital problems. *    Imaging:   CT SOFT TISSUE NECK W CONTRAST   Final Result   No acute abnormality of the soft tissue structures of the neck. CT HEAD WO CONTRAST   Final Result   1. No acute intracranial findings. 2. Nonspecific mild leukoencephalopathy which could reflect early   microvascular ischemic changes, but the myelination is a possibility. 3. Mild mucosal thickening involving the maxillary and ethmoid sinuses. XR CHEST PORTABLE   Final Result   No radiographic evidence of an acute cardiopulmonary process.          CTA PULMONARY W CONTRAST    (Results Pending)     Abnormal labs:   Abnormal Labs Reviewed   COVID-19, RAPID - Abnormal; Notable for the following components:       Result Value    SARS-CoV-2, NAAT DETECTED (*)     All other components within normal limits   CBC WITH AUTO DIFFERENTIAL - Abnormal; Notable for the following components:    MCV 76.0 (*)     MCH 24.2 (*)     MCHC 31.9 (*)     RDW 15.7 (*)     MPV 11.8 (*)     Segs Relative 69.8 (*)     Lymphocytes % 18.6 (*)     Monocytes % 9.8 (*)     All other components within normal limits   COMPREHENSIVE METABOLIC PANEL W/ REFLEX TO MG FOR LOW K - Abnormal; Notable for the following components:    Sodium 131 (*)     Chloride 95 (*)     Creatinine 0.5 (*)     Glucose 227 (*)     All other components within normal limits     Critical values: no     Abnormal Assessment Findings:      Background  History:   Past Medical History:   Diagnosis Date    GERD (gastroesophageal reflux disease)     SVT (supraventricular tachycardia) (HCC)     Takes Metoprolol - previously saw Dr. Jimena Garcia, now PCP       Assessment    Vitals/MEWS: MEWS Score: 0  Level of Consciousness: Alert (0)   Vitals:    11/19/22 1650 11/19/22 1708 11/19/22 1812 11/19/22 2352   BP: (!) 144/91  (!) 151/87 (!) 113/58   Pulse: (!) 114  (!) 103 76   Resp: 18  17 13   Temp:  99.2 °F (37.3 °C) 99.2 °F (37.3 °C) 97.9 °F (36.6 °C)   TempSrc:  Oral Oral Oral   SpO2: 95%  93% 99%   Weight: 195 lb (88.5 kg)      Height: 5' 6\" (1.676 m)        FiO2 (%): nasal cannula for respiratory distress  O2 Flow Rate: O2 Device: None (Room air)    Cardiac Rhythm:    Pain Assessment:   [x] Verbal [] Kateryna Carrow Scale  Pain Scale: Pain Assessment  Pain Assessment: 0-10  Pain Level: 10  Pain Location: (S)  (all over)  Pain Descriptors: Aching  Pain Frequency: Continuous  Pain Onset: Progressive  Last documented pain score (0-10 scale) Pain Level: 10  Last documented pain medication administered: 1930  Mental Status: oriented, alert, coherent, logical, thought processes intact, and able to concentrate and follow conversation  NIH Score:    C-SSRS: Risk of Suicide: No Risk  Bedside swallow:    Ian Coma Scale (GCS): [place] Coma Scale  Eye Opening: Spontaneous  Best Verbal Response: Oriented  Best Motor Response: Obeys commands  Ian Coma Scale Score: 15  Active LDA's:   Peripheral IV 11/19/22 Left; Anterior Hand (Active)       Peripheral IV 11/19/22 Right Antecubital (Active)   Site Assessment Clean, dry & intact 11/19/22 2248   Line Status Blood return noted;Normal saline locked; Flushed 11/19/22 4265 Phlebitis Assessment No symptoms 11/19/22 2248   Infiltration Assessment 0 11/19/22 2248     PO Status: Regular  Pertinent or High Risk Medications/Drips: no   If Yes, please provide details:    Pending Blood Product Administration: no     You may also review the ED PT Care Timeline found under the Summary Nursing Index tab. Recommendation    Pending orders    Plan for Discharge (if known):    Additional Comments:     If any further questions, please call Sending RN at [facility]    Electronically signed by: Electronically signed by Robin Ramey RN on 11/19/2022 at 11:53 PM       Robin Ramey RN  11/19/22 2144

## 2022-11-20 NOTE — PROGRESS NOTES
Dr. Janeth Brenner ordering pt to change to med surg. Put will stay on the unit since pt is COVID positive.

## 2022-11-21 LAB
ALBUMIN SERPL-MCNC: 3.4 GM/DL (ref 3.4–5)
ALP BLD-CCNC: 94 IU/L (ref 40–128)
ALT SERPL-CCNC: 14 U/L (ref 10–40)
ANION GAP SERPL CALCULATED.3IONS-SCNC: 9 MMOL/L (ref 4–16)
AST SERPL-CCNC: 15 IU/L (ref 15–37)
BASOPHILS ABSOLUTE: 0 K/CU MM
BASOPHILS RELATIVE PERCENT: 0.5 % (ref 0–1)
BILIRUB SERPL-MCNC: 0.2 MG/DL (ref 0–1)
BUN BLDV-MCNC: 9 MG/DL (ref 6–23)
C-REACTIVE PROTEIN, HIGH SENSITIVITY: 25.7 MG/L
CALCIUM SERPL-MCNC: 8.4 MG/DL (ref 8.3–10.6)
CHLORIDE BLD-SCNC: 103 MMOL/L (ref 99–110)
CO2: 22 MMOL/L (ref 21–32)
CREAT SERPL-MCNC: 0.5 MG/DL (ref 0.6–1.1)
CULTURE: NORMAL
DIFFERENTIAL TYPE: ABNORMAL
EKG ATRIAL RATE: 100 BPM
EKG DIAGNOSIS: NORMAL
EKG P AXIS: 43 DEGREES
EKG P-R INTERVAL: 126 MS
EKG Q-T INTERVAL: 308 MS
EKG QRS DURATION: 66 MS
EKG QTC CALCULATION (BAZETT): 397 MS
EKG R AXIS: 22 DEGREES
EKG T AXIS: 17 DEGREES
EKG VENTRICULAR RATE: 100 BPM
EOSINOPHILS ABSOLUTE: 0 K/CU MM
EOSINOPHILS RELATIVE PERCENT: 0.2 % (ref 0–3)
FIBRINOGEN LEVEL: 440 MG/DL (ref 196.9–442.1)
GFR SERPL CREATININE-BSD FRML MDRD: >60 ML/MIN/1.73M2
GLUCOSE BLD-MCNC: 295 MG/DL (ref 70–99)
HCT VFR BLD CALC: 36.1 % (ref 37–47)
HEMOGLOBIN: 11.1 GM/DL (ref 12.5–16)
IMMATURE NEUTROPHIL %: 0.2 % (ref 0–0.43)
LYMPHOCYTES ABSOLUTE: 2.4 K/CU MM
LYMPHOCYTES RELATIVE PERCENT: 28.3 % (ref 24–44)
Lab: NORMAL
MCH RBC QN AUTO: 23.8 PG (ref 27–31)
MCHC RBC AUTO-ENTMCNC: 30.7 % (ref 32–36)
MCV RBC AUTO: 77.3 FL (ref 78–100)
MONOCYTES ABSOLUTE: 0.5 K/CU MM
MONOCYTES RELATIVE PERCENT: 6.3 % (ref 0–4)
NUCLEATED RBC %: 0 %
PDW BLD-RTO: 15.9 % (ref 11.7–14.9)
PLATELET # BLD: 195 K/CU MM (ref 140–440)
PMV BLD AUTO: 11.7 FL (ref 7.5–11.1)
POTASSIUM SERPL-SCNC: 4.3 MMOL/L (ref 3.5–5.1)
RBC # BLD: 4.67 M/CU MM (ref 4.2–5.4)
SEGMENTED NEUTROPHILS ABSOLUTE COUNT: 5.4 K/CU MM
SEGMENTED NEUTROPHILS RELATIVE PERCENT: 64.5 % (ref 36–66)
SODIUM BLD-SCNC: 134 MMOL/L (ref 135–145)
SPECIMEN: NORMAL
STREP A DIRECT SCREEN: NEGATIVE
TOTAL IMMATURE NEUTOROPHIL: 0.02 K/CU MM
TOTAL NUCLEATED RBC: 0 K/CU MM
TOTAL PROTEIN: 5.5 GM/DL (ref 6.4–8.2)
VITAMIN D 25-HYDROXY: 18.21 NG/ML
WBC # BLD: 8.4 K/CU MM (ref 4–10.5)

## 2022-11-21 PROCEDURE — 6370000000 HC RX 637 (ALT 250 FOR IP): Performed by: STUDENT IN AN ORGANIZED HEALTH CARE EDUCATION/TRAINING PROGRAM

## 2022-11-21 PROCEDURE — 82306 VITAMIN D 25 HYDROXY: CPT

## 2022-11-21 PROCEDURE — 93010 ELECTROCARDIOGRAM REPORT: CPT | Performed by: INTERNAL MEDICINE

## 2022-11-21 PROCEDURE — 36415 COLL VENOUS BLD VENIPUNCTURE: CPT

## 2022-11-21 PROCEDURE — 6370000000 HC RX 637 (ALT 250 FOR IP)

## 2022-11-21 PROCEDURE — 80053 COMPREHEN METABOLIC PANEL: CPT

## 2022-11-21 PROCEDURE — 85025 COMPLETE CBC W/AUTO DIFF WBC: CPT

## 2022-11-21 PROCEDURE — 86140 C-REACTIVE PROTEIN: CPT

## 2022-11-21 PROCEDURE — 85384 FIBRINOGEN ACTIVITY: CPT

## 2022-11-21 PROCEDURE — 92610 EVALUATE SWALLOWING FUNCTION: CPT | Performed by: SPEECH-LANGUAGE PATHOLOGIST

## 2022-11-21 PROCEDURE — 6360000002 HC RX W HCPCS: Performed by: STUDENT IN AN ORGANIZED HEALTH CARE EDUCATION/TRAINING PROGRAM

## 2022-11-21 PROCEDURE — 93306 TTE W/DOPPLER COMPLETE: CPT

## 2022-11-21 PROCEDURE — 94761 N-INVAS EAR/PLS OXIMETRY MLT: CPT

## 2022-11-21 PROCEDURE — 1200000000 HC SEMI PRIVATE

## 2022-11-21 PROCEDURE — 2580000003 HC RX 258: Performed by: STUDENT IN AN ORGANIZED HEALTH CARE EDUCATION/TRAINING PROGRAM

## 2022-11-21 PROCEDURE — 2580000003 HC RX 258: Performed by: PHYSICIAN ASSISTANT

## 2022-11-21 PROCEDURE — 2580000003 HC RX 258: Performed by: EMERGENCY MEDICINE

## 2022-11-21 RX ORDER — LANOLIN ALCOHOL/MO/W.PET/CERES
3 CREAM (GRAM) TOPICAL NIGHTLY PRN
Status: DISCONTINUED | OUTPATIENT
Start: 2022-11-21 | End: 2022-11-22 | Stop reason: HOSPADM

## 2022-11-21 RX ADMIN — PANTOPRAZOLE SODIUM 40 MG: 40 TABLET, DELAYED RELEASE ORAL at 05:14

## 2022-11-21 RX ADMIN — ESCITALOPRAM OXALATE 10 MG: 10 TABLET ORAL at 08:19

## 2022-11-21 RX ADMIN — SODIUM CHLORIDE, PRESERVATIVE FREE 10 ML: 5 INJECTION INTRAVENOUS at 08:21

## 2022-11-21 RX ADMIN — Medication 3 MG: at 19:12

## 2022-11-21 RX ADMIN — ENOXAPARIN SODIUM 40 MG: 100 INJECTION SUBCUTANEOUS at 08:20

## 2022-11-21 RX ADMIN — SODIUM CHLORIDE: 9 INJECTION, SOLUTION INTRAVENOUS at 21:45

## 2022-11-21 RX ADMIN — SODIUM CHLORIDE: 9 INJECTION, SOLUTION INTRAVENOUS at 11:42

## 2022-11-21 RX ADMIN — ACETAMINOPHEN 650 MG: 325 TABLET ORAL at 18:01

## 2022-11-21 RX ADMIN — METOPROLOL TARTRATE 50 MG: 50 TABLET, FILM COATED ORAL at 21:43

## 2022-11-21 RX ADMIN — Medication 1 LOZENGE: at 08:19

## 2022-11-21 RX ADMIN — METOPROLOL TARTRATE 50 MG: 50 TABLET, FILM COATED ORAL at 08:20

## 2022-11-21 RX ADMIN — SODIUM CHLORIDE, PRESERVATIVE FREE 10 ML: 5 INJECTION INTRAVENOUS at 21:44

## 2022-11-21 RX ADMIN — Medication 1 LOZENGE: at 21:47

## 2022-11-21 RX ADMIN — DEXAMETHASONE 6 MG: 4 TABLET ORAL at 08:19

## 2022-11-21 ASSESSMENT — PAIN SCALES - GENERAL
PAINLEVEL_OUTOF10: 8
PAINLEVEL_OUTOF10: 7
PAINLEVEL_OUTOF10: 8
PAINLEVEL_OUTOF10: 6
PAINLEVEL_OUTOF10: 8

## 2022-11-21 ASSESSMENT — PAIN DESCRIPTION - LOCATION
LOCATION: OTHER (COMMENT)
LOCATION: THROAT

## 2022-11-21 ASSESSMENT — PAIN DESCRIPTION - ORIENTATION
ORIENTATION: MID
ORIENTATION: INNER

## 2022-11-21 ASSESSMENT — PAIN DESCRIPTION - FREQUENCY
FREQUENCY: CONTINUOUS

## 2022-11-21 ASSESSMENT — PAIN - FUNCTIONAL ASSESSMENT
PAIN_FUNCTIONAL_ASSESSMENT: ACTIVITIES ARE NOT PREVENTED
PAIN_FUNCTIONAL_ASSESSMENT: ACTIVITIES ARE NOT PREVENTED

## 2022-11-21 ASSESSMENT — PAIN DESCRIPTION - DESCRIPTORS
DESCRIPTORS: ACHING
DESCRIPTORS: BURNING
DESCRIPTORS: SORE
DESCRIPTORS: SORE

## 2022-11-21 ASSESSMENT — PAIN DESCRIPTION - ONSET
ONSET: ON-GOING

## 2022-11-21 ASSESSMENT — PAIN DESCRIPTION - PAIN TYPE
TYPE: ACUTE PAIN

## 2022-11-21 NOTE — PROGRESS NOTES
Speech Language Pathology  Facility/Department: [facility] CV[facility]   CLINICAL BEDSIDE SWALLOW EVALUATION    NAME: Johanna Sosa  : 1970  MRN: [id number]    ADMISSION DATE: 2022  ADMITTING DIAGNOSIS: has Syncopal episodes; Acute respiratory failure with hypoxia ([address] and COVID on their problem list.    Impression  Dysphagia Diagnosis: Swallow function appears WFL  Dysphagia Outcome Severity Scale: Level 6: Within functional limits/Modified independence     Johanna Sosa was seen for a clinical bedside swallow evaluation following admission for Covid-19 and Acute hypoxic respiratory failure. H/o GERD and SVT. Speech was consulted to evaluation swallowing due to pt's c/o dysphagia to solids and liquids as well as significant pain with swallowing. She c/o difficulty meeting nutritional requirements. She endorses a h/o symptoms of uncontrolled reflux including globus sensation, food sticking in her throat, and sore throat. Pt followed all commands for the oral motor exam which was WNL with no asymmetry or weakness. Vocal quality was grossly WNL (pt reports her voice is not at baseline), cough strength was normal.  PO trials of thin liquids by straw, pureed and soft solids completed. Pt's oral swallow appears WNL. Pharyngeal swallow appears WFL with no s/s aspiration across textures presented with intact laryngeal excursion and likely normal swallow timing. Suspect primary esophageal dysphagia related to GERD and mild secondary symptoms r/t pooling in the pharynx due to reduced upper esophageal relaxation. Recommend:  Pureed solids/Thins (Ordered from Regular diet). Refer to GI for further assessment. Dietitian consult for liquid oral nutritional supplement recommendation to assist pt with meeting nutritional requirements.     ONSET DATE: 2022     Recent Chest Xray/CT of Chest:  negative    Date of Eval: 2022  Evaluating Therapist: Martha Thomas, SLP    Current Diet level:  Current Diet : NPO    Primary Complaint  Patient Complaint: globus sensation, pharyngeal pain, dysphagia to solids    Pain:  Pain Assessment  Pain Assessment: 0-10  Pain Level: 8  Patient's Stated Pain Goal: 3  Pain Location: Throat  Pain Orientation: Mid  Pain Descriptors: Sore  Functional Pain Assessment: Activities are not prevented  Pain Type: Acute pain  Pain Frequency: Continuous  Pain Onset: On-going  Non-Pharmaceutical Pain Intervention(s): Emotional support  Response to Pain Intervention: Pain improved but above pain goal  Side Effects: No reported side effects    Reason for Referral  Kelli Brown was referred for a bedside swallow evaluation to assess the efficiency of her swallow function, identify signs and symptoms of aspiration and make recommendations regarding safe dietary consistencies, effective compensatory strategies, and safe eating environment. Treatment Plan  Requires SLP Intervention: Yes  Duration of Treatment: monitor x 1  D/C Recommendations: To be determined  Referral To: GI    Recommended Diet and Intervention  Recommended Form of Meds: PO  Recommendations: Dysphagia treatment  Therapeutic Interventions: Diet tolerance monitoring    Compensatory Swallowing Strategies  Compensatory Swallowing Strategies : Upright as possible for all oral intake;Remain upright for 30-45 minutes after meals    Treatment/Goals  Short-term Goals  Timeframe for Short-term Goals: 1 week  Goal 1: Pt will tolerate Pureed solids/Thins liquids with 0 s/s aspiration 100% (ordered from Regular diet level)  Long-term Goals  Timeframe for Long-term Goals: n/a    General  Chart Reviewed: Yes  Behavior/Cognition: Alert; Cooperative;Pleasant mood  Respiratory Status: O2 via nasual cannula  Communication Observation: Functional  Follows Directions: Complex  Dentition: Adequate  Patient Positioning: Upright in bed  Baseline Vocal Quality: Normal  Volitional Cough: Strong  Prior Dysphagia History: h/o s/s c/w esophageal dysphagia  Consistencies Administered: Soft and Bite-Sized; Thin - straw; Thin - cup; Thin - teaspoon;Pureed    Vision/Hearing  Hearing  Hearing: Within functional limits    Oral Motor Deficits  Oral/Motor  Oral Hygiene: Moist;Clean    Oral Phase Dysfunction  Oral Phase  Oral Phase: WNL     Indicators of Pharyngeal Phase Dysfunction  Pharyngeal Phase   Pharyngeal Phase: WFL    Prognosis  Individuals consulted  Consulted and agree with results and recommendations: Patient;RN  RN Name: Rex Fink left message for Lacie    Education  Patient Education: results and recommendations  Patient Education Response: Demonstrated understanding  Safety Devices in place: Yes  Type of devices: All fall risk precautions in place; Left in bed       Therapy Time  [id number]  11/21/2022 5:31 PM

## 2022-11-21 NOTE — PROGRESS NOTES
V2.0  [facility] [place]      Name:  Pam Goncalves /Age/Sex: 1970  (50 y.o. female)   MRN & CSN:  [id number] & [id number] Encounter Date/Time: 2022 2:56 PM EST    Location:  -A PCP: Aracelis Jenkins MD       Hospital Day: 3    Assessment and Plan:   Pam Goncalves is a 50 y.o. female with pmh of  depression and SVT  who presents with COVID 19 infection      Plan:  #COVID-19 infection  --Remains on room air  --will discontinue decadron as patient not requiring any supplemental oxygen since she has been in hospital  --Will hold on Baricitinib at this time, can consider if patient decompensates  --Isolation precautions  --Albuterol as needed    #Dysphagia  -intermittent in nature, more oropharygeal from history, h/o GERD and takes omeprazole, speech consult to evaluate if patient needs barium swallow, await recs     #Syncopal episode  --Likely 2/2 to COVID infection, and decreased PO intake, however patient has a history of SVT, monitor on tele  --Continue metoprolol 50mg BID  --EKG showed  in NSR, tele suggestive of normal HR and no abnormal rhythm noted, Echo results pending     #H/O SVT  --Continue metoprolol 50mg BID with holding parameters     #H/O depression  --Continue Escitalopram    Diet ADULT DIET; Full Liquid   DVT Prophylaxis [x] Lovenox, []  Heparin, [] SCDs, [] Ambulation,  [] Eliquis, [] Xarelto  [] Coumadin   Code Status Full Code   Disposition From: home  Expected Disposition: home  Estimated Date of Discharge: 1-2 days  Patient requires continued admission due to dysphagia   Surrogate Decision Maker/ POA      Subjective:     Chief Complaint: Loss of Consciousness (At urgent care and had syncopal episode. Flu like symptoms. Hx SVT, not eating and drinking well)       Pam Goncalves is a 50 y.o. female who presents with weakness, SOB and dyshagia. C/o drainage in back of throat, muscle aches.  C/o difficulty swallowing liquids intermittently, feels like it is stuck in throat, going on from last wednesday         Review of Systems:    Review of Systems 10 point ROS negative except as noted above. Objective:   No intake or output data in the 24 hours ending 11/21/22 1456     Vitals:   Vitals:    11/21/22 1143   BP: 115/65   Pulse: 66   Resp:    Temp: 98.2 °F (36.8 °C)   SpO2:        Physical Exam:   Physical Exam  Constitutional:       General: She is not in acute distress. Appearance: She is normal weight. She is not diaphoretic. HENT:      Head: Normocephalic and atraumatic. Right Ear: Tympanic membrane normal.      Left Ear: Tympanic membrane normal.      Nose: Nose normal.      Mouth/Throat:      Mouth: Mucous membranes are moist.      Pharynx: Oropharynx is clear. Eyes:      Pupils: Pupils are equal, round, and reactive to light. Cardiovascular:      Rate and Rhythm: Normal rate and regular rhythm. Pulses: Normal pulses. Heart sounds: Normal heart sounds. No murmur heard. No friction rub. No gallop. Pulmonary:      Effort: Pulmonary effort is normal. No respiratory distress. Breath sounds: Normal breath sounds. No wheezing or rales. Abdominal:      General: Abdomen is flat. Bowel sounds are normal. There is no distension. Palpations: Abdomen is soft. Tenderness: There is no abdominal tenderness. There is no guarding. Musculoskeletal:         General: No swelling, tenderness or deformity. Normal range of motion. Cervical back: Normal range of motion and neck supple. Right lower leg: No edema. Left lower leg: No edema. Skin:     General: Skin is warm. Coloration: Skin is not jaundiced or pale. Findings: No bruising, erythema, lesion or rash. Neurological:      General: No focal deficit present. Mental Status: She is alert and oriented to person, place, and time. Mental status is at baseline. Cranial Nerves: No cranial nerve deficit.       Sensory: No sensory deficit. Motor: No weakness.       Gait: Gait normal.   Psychiatric:         Mood and Affect: Mood normal.          Medications:   Medications:    escitalopram  10 mg Oral Daily    pantoprazole  40 mg Oral QAM AC    enoxaparin  40 mg SubCUTAneous Daily    metoprolol tartrate  50 mg Oral BID    dexamethasone  6 mg Oral Daily    sodium chloride flush  5-40 mL IntraVENous BID    sodium chloride flush  5-40 mL IntraVENous BID      Infusions:    sodium chloride      sodium chloride 100 mL/hr at 11/21/22 1142     PRN Meds: sodium chloride flush, 5-40 mL, PRN  sodium chloride, , PRN  ondansetron, 4 mg, Q8H PRN   Or  ondansetron, 4 mg, Q6H PRN  polyethylene glycol, 17 g, Daily PRN  acetaminophen, 650 mg, Q6H PRN   Or  acetaminophen, 650 mg, Q6H PRN  albuterol sulfate HFA, 2 puff, Q6H PRN  guaiFENesin-dextromethorphan, 5 mL, Q4H PRN  ibuprofen, 400 mg, Q6H PRN  phenol, 1 spray, Q2H PRN  Benzocaine-Menthol, 1 lozenge, Q2H PRN        Labs      Recent Results (from the past 24 hour(s))   Respiratory Panel, Molecular, with COVID-19 (Restricted: peds pts or suitable admitted adults)    Collection Time: 11/20/22  6:40 PM    Specimen: Nasopharyngeal   Result Value Ref Range    Adenovirus Detection by PCR NOT DETECTED NOT DETECTED    Coronavirus 229E PCR NOT DETECTED NOT DETECTED    Coronavirus HKU1 PCR NOT DETECTED NOT DETECTED    Coronavirus NL63 PCR NOT DETECTED NOT DETECTED    Coronavirus OC43 PCR NOT DETECTED NOT DETECTED    SARS-CoV-2 DETECTED BY PCR (A) NOT DETECTED    Human Metapneumovirus PCR NOT DETECTED NOT DETECTED    Rhinovirus Enterovirus PCR NOT DETECTED NOT DETECTED    Influenza A by PCR NOT DETECTED NOT DETECTED    Influenza A H1 Pandemic PCR NOT DETECTED NOT DETECTED    Influenza A H1 (2009) PCR NOT DETECTED NOT DETECTED    Influenza A H3 PCR NOT DETECTED NOT DETECTED    Influenza B by PCR NOT DETECTED NOT DETECTED    Parainfluenza 1 PCR NOT DETECTED NOT DETECTED    Parainfluenza 2 PCR NOT DETECTED NOT DETECTED Parainfluenza 3 PCR NOT DETECTED NOT DETECTED    Parainfluenza 4 PCR NOT DETECTED NOT DETECTED    RSV PCR NOT DETECTED NOT DETECTED    Bordetella parapertussis by PCR NOT DETECTED NOT DETECTED    B Pertussis by PCR NOT DETECTED NOT DETECTED    Chlamydophila Pneumonia PCR NOT DETECTED NOT DETECTED    Mycoplasma pneumo by PCR NOT DETECTED NOT DETECTED   Vitamin D 25 Hydroxy    Collection Time: 11/21/22  4:43 AM   Result Value Ref Range    Vit D, 25-Hydroxy 18.21 (L) >20 NG/ML   CBC with Auto Differential    Collection Time: 11/21/22  4:43 AM   Result Value Ref Range    WBC 8.4 4.0 - 10.5 K/CU MM    RBC 4.67 4.2 - 5.4 M/CU MM    Hemoglobin 11.1 (L) 12.5 - 16.0 GM/DL    Hematocrit 36.1 (L) 37 - 47 %    MCV 77.3 (L) 78 - 100 FL    MCH 23.8 (L) 27 - 31 PG    MCHC 30.7 (L) 32.0 - 36.0 %    RDW 15.9 (H) 11.7 - 14.9 %    Platelets 307 593 - 966 K/CU MM    MPV 11.7 (H) 7.5 - 11.1 FL    Differential Type AUTOMATED DIFFERENTIAL     Segs Relative 64.5 36 - 66 %    Lymphocytes % 28.3 24 - 44 %    Monocytes % 6.3 (H) 0 - 4 %    Eosinophils % 0.2 0 - 3 %    Basophils % 0.5 0 - 1 %    Segs Absolute 5.4 K/CU MM    Lymphocytes Absolute 2.4 K/CU MM    Monocytes Absolute 0.5 K/CU MM    Eosinophils Absolute 0.0 K/CU MM    Basophils Absolute 0.0 K/CU MM    Nucleated RBC % 0.0 %    Total Nucleated RBC 0.0 K/CU MM    Total Immature Neutrophil 0.02 K/CU MM    Immature Neutrophil % 0.2 0 - 0.43 %   Comprehensive Metabolic Panel w/ Reflex to MG    Collection Time: 11/21/22  4:43 AM   Result Value Ref Range    Sodium 134 (L) 135 - 145 MMOL/L    Potassium 4.3 3.5 - 5.1 MMOL/L    Chloride 103 99 - 110 mMol/L    CO2 22 21 - 32 MMOL/L    BUN 9 6 - 23 MG/DL    Creatinine 0.5 (L) 0.6 - 1.1 MG/DL    Est, Glom Filt Rate >60 >60 mL/min/1.73m2    Glucose 295 (H) 70 - 99 MG/DL    Calcium 8.4 8.3 - 10.6 MG/DL    Albumin 3.4 3.4 - 5.0 GM/DL    Total Protein 5.5 (L) 6.4 - 8.2 GM/DL    Total Bilirubin 0.2 0.0 - 1.0 MG/DL    ALT 14 10 - 40 U/L    AST 15 15 - 37 IU/L    Alkaline Phosphatase 94 40 - 128 IU/L    Anion Gap 9 4 - 16   Fibrinogen    Collection Time: 11/21/22  4:43 AM   Result Value Ref Range    Fibrinogen 440 196.9 - 442.1 MG/DL   C-Reactive Protein    Collection Time: 11/21/22  4:43 AM   Result Value Ref Range    CRP High Sensitivity 25.7 (H) <5.0 mg/L        Imaging/Diagnostics Last 24 Hours   CT HEAD WO CONTRAST    Result Date: 11/19/2022  EXAMINATION: CT OF THE HEAD WITHOUT CONTRAST  11/19/2022 6:46 pm TECHNIQUE: CT of the head was performed without the administration of intravenous contrast. Automated exposure control, iterative reconstruction, and/or weight based adjustment of the mA/kV was utilized to reduce the radiation dose to as low as reasonably achievable. COMPARISON: None. HISTORY: ORDERING SYSTEM PROVIDED HISTORY: syncope TECHNOLOGIST PROVIDED HISTORY: Has a \"code stroke\" or \"stroke alert\" been called? ->No Reason for exam:->syncope Decision Support Exception - unselect if not a suspected or confirmed emergency medical condition->Emergency Medical Condition (MA) Is the patient pregnant?->No Reason for Exam: Loss of Consciousness FINDINGS: BRAIN/VENTRICLES: There is no acute intracranial hemorrhage, mass effect or midline shift. No abnormal extra-axial fluid collection. The gray-white differentiation is maintained without evidence of an acute infarct. There is no evidence of hydrocephalus. There are multifocal areas of white matter hypoattenuation which are mild. ORBITS: The visualized portion of the orbits demonstrate no acute abnormality. SINUSES: There is mucosal thickening in the ethmoid and bilateral maxillary sinuses. The other paranasal sinuses and the mastoid air cells are normally aerated. SOFT TISSUES/SKULL:  No acute abnormality of the visualized skull or soft tissues. 1. No acute intracranial findings.  2. Nonspecific mild leukoencephalopathy which could reflect early microvascular ischemic changes, but the myelination is a possibility. 3. Mild mucosal thickening involving the maxillary and ethmoid sinuses. CT SOFT TISSUE NECK W CONTRAST    Result Date: 11/19/2022  EXAMINATION: CT OF THE NECK SOFT TISSUE WITH CONTRAST  11/19/2022 TECHNIQUE: CT of the neck was performed with the administration of intravenous contrast. Multiplanar reformatted images are provided for review. Automated exposure control, iterative reconstruction, and/or weight based adjustment of the mA/kV was utilized to reduce the radiation dose to as low as reasonably achievable. COMPARISON: None. HISTORY: ORDERING SYSTEM PROVIDED HISTORY: swelling sensation TECHNOLOGIST PROVIDED HISTORY: Reason for exam:->swelling sensation Decision Support Exception - unselect if not a suspected or confirmed emergency medical condition->Emergency Medical Condition (MA) Reason for Exam: swelling sensation FINDINGS: PHARYNX/LARYNX:  The palatine tonsils are normal in appearance. The tongue is normal in appearance. The valleculae, epiglottis, aryepiglottic folds and pyriform sinuses appear unremarkable. The true and false vocal cords are normal in appearance. No mass or abscess is seen. SALIVARY GLANDS/THYROID:  The parotid and submandibular glands appear unremarkable. The thyroid gland appears unremarkable. LYMPH NODES:  No cervical or supraclavicular lymphadenopathy is seen. SOFT TISSUES:  No appreciable soft tissue swelling or mass is seen. BRAIN/ORBITS/SINUSES:  The visualized portion of the intracranial contents appear unremarkable. The visualized portion of the orbits, paranasal sinuses and mastoid air cells demonstrate no acute abnormality. LUNG APICES/SUPERIOR MEDIASTINUM:  No focal consolidation is seen within the visualized lung apices. No superior mediastinal lymphadenopathy or mass. The visualized portion of the trachea appears unremarkable. BONES:  No aggressive appearing lytic or blastic bony lesion.      No acute abnormality of the soft tissue structures of the neck.     XR CHEST PORTABLE    Result Date: 11/19/2022  EXAMINATION: ONE XRAY VIEW OF THE CHEST 11/19/2022 4:07 pm COMPARISON: 02/08/2015. HISTORY: ORDERING SYSTEM PROVIDED HISTORY: syncope TECHNOLOGIST PROVIDED HISTORY: Reason for exam:->syncope Reason for Exam: syncope FINDINGS: The lungs and costophrenic angles are clear. The cardiomediastinal silhouette and pulmonary vessels appear normal.     No radiographic evidence of an acute cardiopulmonary process. CTA PULMONARY W CONTRAST    Result Date: 11/20/2022  EXAMINATION: CTA OF THE CHEST 11/19/2022 9:52 pm TECHNIQUE: CTA of the chest was performed after the administration of intravenous contrast.  Multiplanar reformatted images are provided for review. MIP images are provided for review. Automated exposure control, iterative reconstruction, and/or weight based adjustment of the mA/kV was utilized to reduce the radiation dose to as low as reasonably achievable. COMPARISON: None. HISTORY: ORDERING SYSTEM PROVIDED HISTORY: sob TECHNOLOGIST PROVIDED HISTORY: Reason for exam:->sob Decision Support Exception - unselect if not a suspected or confirmed emergency medical condition->Emergency Medical Condition (MA) Reason for Exam: sepsis FINDINGS: Pulmonary Arteries: Bolus timing is good but patient has respiratory motion artifact. Adjusting for the respiratory motion, no convincing evidence of pulmonary arterial embolism. The sensitivity in the distal lower lobe branches is decreased. Mediastinum: No thoracic aortic aneurysm or sign of dissection. Mild left ventricular hypertrophy is suggested. There is no pericardial effusion or mediastinal hematoma. Mediastinal lymph nodes are not enlarged by short axis. Calcified subcarinal and right hilar lymph nodes are present. Lungs/pleura: Respiratory motion does degrade the fine interstitial details and assessment for tiny nodules.   Mild dependent atelectasis in the deep lung bases but without significant pleural effusion and no pneumothorax. There is a 3 mm area of pleural based nodularity along the middle lobe on series 3, image number 57. A less than 2 mm area of thickening along the anterior pleural at the middle lobe on series 3, image number 61. Upper Abdomen: Limited images of the upper abdomen are unremarkable. Soft Tissues/Bones: No acute fractures are seen at the ribs. No collapse, subluxation, or focal deformity at the thoracic spine. Early facet arthropathy is present. 1.  Respiratory motion artifact decreases sensitivity in the distal lower lobe branches. However no convincing evidence for pulmonary arterial embolism. 2.  Minimal atelectatic changes in the lower lungs and has calcified lymph nodes at the mediastinum and right hilum. 3.  A 3 mm and less than 2 mm area of pleural based nodularity along the margin of the middle lobe would not require further workup in a low risk patient. Most likely related to the old granulomatous disease given the calcified lymph nodes. Fleischner Society guidelines for follow-up and management of incidentally detected pulmonary nodules: Multiple Solid Nodules: Nodule size less than 6 mm In a low-risk patient, no routine follow-up. In a high-risk patient, optional CT at 12 months. - Low risk patients include individuals with minimal or absent history of smoking and other known risk factors. - High risk patients include individuals with a history or smoking or known risk factors. Radiology 2017 http://pubs. rsna.org/doi/full/10.1148/radiol. 3442916097       Electronically signed by Kong Jauregui MD on 11/21/2022 at 2:56 PM

## 2022-11-21 NOTE — CARE COORDINATION
Chart reviewed. Patient is from home; IPA. She has a PCP and insurance that assist with Rx when needed. No needs identified at this time. CM will remain available should needs arise.  Brando Nunes RN

## 2022-11-22 VITALS
DIASTOLIC BLOOD PRESSURE: 66 MMHG | OXYGEN SATURATION: 94 % | TEMPERATURE: 98.2 F | RESPIRATION RATE: 19 BRPM | HEART RATE: 61 BPM | SYSTOLIC BLOOD PRESSURE: 126 MMHG | HEIGHT: 66 IN | BODY MASS INDEX: 31.34 KG/M2 | WEIGHT: 195 LBS

## 2022-11-22 LAB — PROCALCITONIN: 0.26

## 2022-11-22 PROCEDURE — 84145 PROCALCITONIN (PCT): CPT

## 2022-11-22 PROCEDURE — 2580000003 HC RX 258: Performed by: PHYSICIAN ASSISTANT

## 2022-11-22 PROCEDURE — 6370000000 HC RX 637 (ALT 250 FOR IP): Performed by: STUDENT IN AN ORGANIZED HEALTH CARE EDUCATION/TRAINING PROGRAM

## 2022-11-22 PROCEDURE — 2580000003 HC RX 258: Performed by: STUDENT IN AN ORGANIZED HEALTH CARE EDUCATION/TRAINING PROGRAM

## 2022-11-22 PROCEDURE — 2580000003 HC RX 258: Performed by: EMERGENCY MEDICINE

## 2022-11-22 PROCEDURE — 6360000002 HC RX W HCPCS: Performed by: STUDENT IN AN ORGANIZED HEALTH CARE EDUCATION/TRAINING PROGRAM

## 2022-11-22 PROCEDURE — 36415 COLL VENOUS BLD VENIPUNCTURE: CPT

## 2022-11-22 PROCEDURE — 94761 N-INVAS EAR/PLS OXIMETRY MLT: CPT

## 2022-11-22 RX ORDER — PANTOPRAZOLE SODIUM 40 MG/1
40 TABLET, DELAYED RELEASE ORAL
Status: DISCONTINUED | OUTPATIENT
Start: 2022-11-22 | End: 2022-11-22 | Stop reason: HOSPADM

## 2022-11-22 RX ORDER — IBUPROFEN 400 MG/1
400 TABLET ORAL EVERY 6 HOURS PRN
Qty: 120 TABLET | Refills: 3 | Status: SHIPPED | OUTPATIENT
Start: 2022-11-22

## 2022-11-22 RX ORDER — ALBUTEROL SULFATE 90 UG/1
2 AEROSOL, METERED RESPIRATORY (INHALATION) EVERY 6 HOURS PRN
Qty: 18 G | Refills: 3 | Status: SHIPPED | OUTPATIENT
Start: 2022-11-22

## 2022-11-22 RX ORDER — BENZONATATE 100 MG/1
100 CAPSULE ORAL 3 TIMES DAILY PRN
Qty: 30 CAPSULE | Refills: 0 | Status: SHIPPED | OUTPATIENT
Start: 2022-11-22 | End: 2022-11-29

## 2022-11-22 RX ADMIN — SODIUM CHLORIDE, PRESERVATIVE FREE 10 ML: 5 INJECTION INTRAVENOUS at 08:31

## 2022-11-22 RX ADMIN — ENOXAPARIN SODIUM 40 MG: 100 INJECTION SUBCUTANEOUS at 08:23

## 2022-11-22 RX ADMIN — ESCITALOPRAM OXALATE 10 MG: 10 TABLET ORAL at 08:22

## 2022-11-22 RX ADMIN — SODIUM CHLORIDE, PRESERVATIVE FREE 10 ML: 5 INJECTION INTRAVENOUS at 08:23

## 2022-11-22 RX ADMIN — SODIUM CHLORIDE: 9 INJECTION, SOLUTION INTRAVENOUS at 08:28

## 2022-11-22 RX ADMIN — ACETAMINOPHEN 650 MG: 325 TABLET ORAL at 00:08

## 2022-11-22 RX ADMIN — IBUPROFEN 400 MG: 400 TABLET, FILM COATED ORAL at 08:22

## 2022-11-22 RX ADMIN — Medication 1 LOZENGE: at 08:22

## 2022-11-22 ASSESSMENT — PAIN SCALES - GENERAL
PAINLEVEL_OUTOF10: 0
PAINLEVEL_OUTOF10: 0
PAINLEVEL_OUTOF10: 3
PAINLEVEL_OUTOF10: 0

## 2022-11-22 ASSESSMENT — PAIN DESCRIPTION - DESCRIPTORS: DESCRIPTORS: DISCOMFORT

## 2022-11-22 ASSESSMENT — PAIN DESCRIPTION - LOCATION: LOCATION: OTHER (COMMENT)

## 2022-11-22 NOTE — CONSULTS
[address], [address] [facility]                                  CONSULTATION    PATIENT NAME: Gabriel Mnceil               :        1970  MED REC NO:   [id number]                          ROOM:       2008  ACCOUNT NO:   [de-identified]                           ADMIT DATE: 2022  PROVIDER:     Shanell Ibrahim MD    CONSULT DATE:  2022    PRIMARY CARE PHYSICIAN:  Kat Beckwith MD    CHIEF COMPLAINT:  History of dysphagia and rule out esophageal  obstruction. HISTORY OF PRESENT ILLNESS:  As follows. The patient is a 68-year-old  white female, patient of Dr. Kat Beckwith, with past medical history  significant for depression, SVT with electrophysiological studies done  with ablation, who was in apparently good health and presented to [facility] on 2022 with congestion, hoarseness of voice, also  sore throat, fatigue and malaise. The patient while there in the [facility] had a syncopal episode and she was referred to the emergency  room for further workup and evaluation. In the emergency room, the  patient was noted to have upper respiratory symptoms with slight  hypoxemia and her COVID-19 came back positive. The patient was admitted  for further workup and management. The patient has been complaining of some \"congestion\" in the back of her  throat and resultant difficulty in swallowing, but there is no history  of vomiting, hematemesis, melena, hematochezia, anorexia or weight loss. The patient has never had an EGD or a colonoscopy done. The patient is  on Protonix and is hemodynamically stable. REVIEW OF SYSTEMS:  CENTRAL NERVOUS SYSTEM EXAMINATION:  The patient denies headache or  focal sensorimotor symptoms. CARDIOVASCULAR SYSTEM:  No history of chest pain, but the patient  complains of mild shortness of breath, but no leg swelling.   GENITOURINARY SYSTEM:  No history of dysuria, pyuria, or hematuria. MUSCULOSKELETAL SYSTEM:  The patient complains of generalized weakness. RESPIRATORY SYSTEM:  The patient has mild sore throat and shortness of  breath, but no hemoptysis, fever or chills. PAST MEDICAL HISTORY:  Significant for history of SVT with  electrophysiological studies done with cardiac ablation, also history of  depression and gastroesophageal reflux disease. FAMILY HISTORY:  Noncontributory. MEDICATIONS:  Please refer to the chart. SOCIOECONOMIC HISTORY:  No history of EtOH abuse. The patient does not  smoke cigarettes. PAST SURGICAL HISTORY:  The patient has had D and C done and also had  electrophysiological studies done with ablation. ALLERGIES:  No known drug allergies. PHYSICAL EXAMINATION:  GENERAL:  Shows a 72-year-old white female of average build and  nutritional status, who is lying comfortably flat in bed, in no acute  distress. She is awake, alert and oriented and pleasant to talk with. VITAL SIGNS:  Stable. HEENT:  Examination shows the skull to be atraumatic. NECK:  Supple. CHEST:  Clear. HEART:  S1 and S2 are normal.  ABDOMEN:  Soft, nontender, nondistended. Liver and spleen are not  palpable. RECTAL:  Exam is deferred. CNS:  Exam shows the patient to be awake, alert and oriented. There are  no focal sensorimotor signs. MUSCULOSKELETAL SYSTEM:  Exam is unremarkable. LABORATORY DATA:  The labs drawn during the present hospitalization  comprised of Chem profile and LFTs which are within normal limits. CBC  shows a WBC count of 8.4, hemoglobin 11.1 and platelet count of 140,308. IMPRESSION:  3  A 72-year-old white female admitted with sore throat, congestion,  malaise and generalized weakness and is noted to have COVID-19  infection, but also complains of difficulty in swallowing and rule out  esophageal obstruction. 2.  History of gastroesophageal reflux disease.   3.  The patient needs screening colonoscopy as well.    RECOMMENDATIONS:  1. Agree with present management with Protonix, but we will increase  the dose to 40 mg b.i.d.  2.  The patient has been instructed to call the office after discharge  for outpatient EGD, in fact once her COVID-19 infection resolves. 3.  The patient will need a screening colonoscopy at a later date as  well. 4.  The case and plan has been discussed in detail with the patient and  all her questions have been answered.         Keyanna Simon MD    D: 11/22/2022 11:26:45       T: 11/22/2022 11:29:40     [id number]  Job#: [id number]     Doc#: [id number]    CC:  Gurjit Sierra MD

## 2022-11-22 NOTE — DISCHARGE SUMMARY
V2.0  Discharge Summary    Name:  Keri Pearl /Age/Sex: 1970 (50 y.o. female)   Admit Date: 2022  Discharge Date: 22    MRN & CSN:  [id number] & [id number] Encounter Date and Time 22 2:31 PM EST    Attending:  No att. providers found Discharging Provider: Silverio Michaels MD       Hospital Course:     Brief HPI:      Patient is a 61-year-old with past medical history of depression and SVT who presented to the urgent care center earlier in the day due to worsening sore throat, fatigue and malaise. While there patient was noted to have a syncopal episode and transferred to the ED for further evaluation. On presentation she was hypertensive, tachycardic, afebrile, with a respiratory rate of 18 and saturating appropriately on room air. Level while in the ED saturations were noted to drop to the low 80s, requiring oxygen with subsequent improvement. Labs are notable for sodium of 131, creatinine of 0.5, blood glucose of 227, proBNP was 30, initial troponin is negative. LFTs unremarkable, H&H of 12.6/39.5 with an MCV of 76. COVID-positive. CT head was negative for any acute intracranial findings, there was some mild leukoencephalopathy which could reflect early microvascular ischemic changes as well as mild mucosal thickening involving the maxillary and ethmoid sinuses. CT of the neck with contrast was negative for any acute abnormalities of the soft tissue structures CTA pulmonary with contrast showed no convincing evidence for pulmonary arterial embolism, there was some minimal atelectatic changes in the lower lungs as well as calcified lymph nodes at the mediastinum and right hilum. There was a 3 mm less than 2 mm area of pleural placed nodularity along the margin of the middle lobe. Patient is status post 2.5L bolus in the ED and 10mg of decadron IV. On encounter, she is sitting in bed, appears ill and very tired, daughter is at bedside.   She endorses that post the steroid shot her throat feels much better and that she feels she may be able to eat something as she has been unable to eat for the past day. She is being admitted for acute respiratory failure in the setting of COVID-19 associated with a syncopal episode. Brief Problem Based Course:     #COVID-19 infection  --Remains on room air  --off decadron as patient not requiring any supplemental oxygen since she has been in hospital  --Isolation precautions  --Albuterol as needed     #Dysphagia  -intermittent in nature, more oropharygeal from history, h/o GERD and takes omeprazole, speech consult to evaluate if patient needs barium swallow, await recs  -GI consulted and planning EGD as outpatient     #Syncopal episode  --Likely 2/2 to COVID infection, and decreased PO intake, however patient has a history of SVT  --Continue metoprolol 50mg BID  --EKG reviewed  -2D echo showed EF 55 to 60%     #H/O SVT  --Continue metoprolol 50mg BID with holding parameters     #H/O depression  --Continue Escitalopram      The patient expressed appropriate understanding of, and agreement with the discharge recommendations, medications, and plan.      Consults this admission:  IP CONSULT TO GI    Discharge Diagnosis:   COVID        Discharge Instruction:   Follow up appointments: Og Biswas through  Primary care physician: Saqib Pillai MD within 2 weeks  Diet: regular diet   Activity: activity as tolerated  Disposition: Discharged to:   [x]Home, [][facility], []SNF, []Acute Rehab, []Hospice   Condition on discharge: Stable  Labs and Tests to be Followed up as an outpatient by PCP or Specialist:     Discharge Medications:        Medication List        START taking these medications      albuterol sulfate  (90 Base) MCG/ACT inhaler  Commonly known as: PROVENTIL;VENTOLIN;PROAIR  Inhale 2 puffs into the lungs every 6 hours as needed for Wheezing or Shortness of Breath     Benzocaine-Menthol 6-10 MG Lozg lozenge  Commonly known as: CEPACOL  Take 1 lozenge by mouth every 2 hours as needed for Sore Throat or Pain     benzonatate 100 MG capsule  Commonly known as: TESSALON  Take 1 capsule by mouth 3 times daily as needed for Cough     ibuprofen 400 MG tablet  Commonly known as: ADVIL;MOTRIN  Take 1 tablet by mouth every 6 hours as needed for Fever            CONTINUE taking these medications      B-D 3CC LUER-BIBIANA SYR 25GX1\" 25G X 1\" 3 ML Misc  Generic drug: SYRINGE-NEEDLE (DISP) 3 ML  USE INITIALLY THREE TIMES A WEEK FOR VITAMIN B12 (CYANOCOBALAMIN) INJECTION DOSING, THEN EVENTUALLY TITRATE TO ONCE A MONTH DOSING AS DIRECTED.     cyanocobalamin 1000 MCG/ML injection  INJECT 1ML AS DIRECTED THREE TIMES A WEEK FOR 2 WEEKS, THEN TWICE A WEEK FOR 2 WEEKS, THEN ONCE A WEEK FOR 3 MONTHS, THEN ONCE A MONTH THEREAFTER.     escitalopram 20 MG tablet  Commonly known as: Lexapro  Take 0.5 tablets by mouth in the morning. metoprolol tartrate 50 MG tablet  Commonly known as: LOPRESSOR  TAKE ONE TABLET BY MOUTH TWICE A DAY     omeprazole 20 MG delayed release capsule  Commonly known as: PRILOSEC  Take 1 capsule by mouth in the morning. Where to Get Your Medications        These medications were sent to [address] [address], [address] [facility]      Phone: [phone]   albuterol sulfate  (90 Base) MCG/ACT inhaler  Benzocaine-Menthol 6-10 MG Lozg lozenge  benzonatate 100 MG capsule  ibuprofen 400 MG tablet        Objective Findings at Discharge:   /66   Pulse 61   Temp 98.2 °F (36.8 °C) (Oral)   Resp 19   Ht 5' 5.97\" (1.676 m)   Wt 195 lb (88.5 kg)   SpO2 94%   BMI 31.50 kg/m²       Physical Exam:   General: NAD  Eyes: EOMI  ENT: neck supple  Cardiovascular: Regular rate.   Respiratory: Clear to auscultation  Gastrointestinal: Soft, non tender  Genitourinary: no suprapubic tenderness  Musculoskeletal: No edema  Skin: warm, dry  Neuro: Alert. Psych: Mood appropriate. Labs and Imaging   CT HEAD WO CONTRAST    Result Date: 11/19/2022  EXAMINATION: CT OF THE HEAD WITHOUT CONTRAST  11/19/2022 6:46 pm TECHNIQUE: CT of the head was performed without the administration of intravenous contrast. Automated exposure control, iterative reconstruction, and/or weight based adjustment of the mA/kV was utilized to reduce the radiation dose to as low as reasonably achievable. COMPARISON: None. HISTORY: ORDERING SYSTEM PROVIDED HISTORY: syncope TECHNOLOGIST PROVIDED HISTORY: Has a \"code stroke\" or \"stroke alert\" been called? ->No Reason for exam:->syncope Decision Support Exception - unselect if not a suspected or confirmed emergency medical condition->Emergency Medical Condition (MA) Is the patient pregnant?->No Reason for Exam: Loss of Consciousness FINDINGS: BRAIN/VENTRICLES: There is no acute intracranial hemorrhage, mass effect or midline shift. No abnormal extra-axial fluid collection. The gray-white differentiation is maintained without evidence of an acute infarct. There is no evidence of hydrocephalus. There are multifocal areas of white matter hypoattenuation which are mild. ORBITS: The visualized portion of the orbits demonstrate no acute abnormality. SINUSES: There is mucosal thickening in the ethmoid and bilateral maxillary sinuses. The other paranasal sinuses and the mastoid air cells are normally aerated. SOFT TISSUES/SKULL:  No acute abnormality of the visualized skull or soft tissues. 1. No acute intracranial findings. 2. Nonspecific mild leukoencephalopathy which could reflect early microvascular ischemic changes, but the myelination is a possibility. 3. Mild mucosal thickening involving the maxillary and ethmoid sinuses.      CT SOFT TISSUE NECK W CONTRAST    Result Date: 11/19/2022  EXAMINATION: CT OF THE NECK SOFT TISSUE WITH CONTRAST  11/19/2022 TECHNIQUE: CT of the neck was performed with the administration of intravenous contrast. Multiplanar reformatted images are provided for review. Automated exposure control, iterative reconstruction, and/or weight based adjustment of the mA/kV was utilized to reduce the radiation dose to as low as reasonably achievable. COMPARISON: None. HISTORY: ORDERING SYSTEM PROVIDED HISTORY: swelling sensation TECHNOLOGIST PROVIDED HISTORY: Reason for exam:->swelling sensation Decision Support Exception - unselect if not a suspected or confirmed emergency medical condition->Emergency Medical Condition (MA) Reason for Exam: swelling sensation FINDINGS: PHARYNX/LARYNX:  The palatine tonsils are normal in appearance. The tongue is normal in appearance. The valleculae, epiglottis, aryepiglottic folds and pyriform sinuses appear unremarkable. The true and false vocal cords are normal in appearance. No mass or abscess is seen. SALIVARY GLANDS/THYROID:  The parotid and submandibular glands appear unremarkable. The thyroid gland appears unremarkable. LYMPH NODES:  No cervical or supraclavicular lymphadenopathy is seen. SOFT TISSUES:  No appreciable soft tissue swelling or mass is seen. BRAIN/ORBITS/SINUSES:  The visualized portion of the intracranial contents appear unremarkable. The visualized portion of the orbits, paranasal sinuses and mastoid air cells demonstrate no acute abnormality. LUNG APICES/SUPERIOR MEDIASTINUM:  No focal consolidation is seen within the visualized lung apices. No superior mediastinal lymphadenopathy or mass. The visualized portion of the trachea appears unremarkable. BONES:  No aggressive appearing lytic or blastic bony lesion. No acute abnormality of the soft tissue structures of the neck. XR CHEST PORTABLE    Result Date: 11/19/2022  EXAMINATION: ONE XRAY VIEW OF THE CHEST 11/19/2022 4:07 pm COMPARISON: 02/08/2015.  HISTORY: ORDERING SYSTEM PROVIDED HISTORY: syncope TECHNOLOGIST PROVIDED HISTORY: Reason for exam:->syncope Reason for Exam: syncope FINDINGS: The lungs and costophrenic angles are clear. The cardiomediastinal silhouette and pulmonary vessels appear normal.     No radiographic evidence of an acute cardiopulmonary process. CTA PULMONARY W CONTRAST    Result Date: 11/20/2022  EXAMINATION: CTA OF THE CHEST 11/19/2022 9:52 pm TECHNIQUE: CTA of the chest was performed after the administration of intravenous contrast.  Multiplanar reformatted images are provided for review. MIP images are provided for review. Automated exposure control, iterative reconstruction, and/or weight based adjustment of the mA/kV was utilized to reduce the radiation dose to as low as reasonably achievable. COMPARISON: None. HISTORY: ORDERING SYSTEM PROVIDED HISTORY: sob TECHNOLOGIST PROVIDED HISTORY: Reason for exam:->sob Decision Support Exception - unselect if not a suspected or confirmed emergency medical condition->Emergency Medical Condition (MA) Reason for Exam: sepsis FINDINGS: Pulmonary Arteries: Bolus timing is good but patient has respiratory motion artifact. Adjusting for the respiratory motion, no convincing evidence of pulmonary arterial embolism. The sensitivity in the distal lower lobe branches is decreased. Mediastinum: No thoracic aortic aneurysm or sign of dissection. Mild left ventricular hypertrophy is suggested. There is no pericardial effusion or mediastinal hematoma. Mediastinal lymph nodes are not enlarged by short axis. Calcified subcarinal and right hilar lymph nodes are present. Lungs/pleura: Respiratory motion does degrade the fine interstitial details and assessment for tiny nodules. Mild dependent atelectasis in the deep lung bases but without significant pleural effusion and no pneumothorax. There is a 3 mm area of pleural based nodularity along the middle lobe on series 3, image number 57. A less than 2 mm area of thickening along the anterior pleural at the middle lobe on series 3, image number 61.  Upper Abdomen: Limited images of the upper abdomen are unremarkable. Soft Tissues/Bones: No acute fractures are seen at the ribs. No collapse, subluxation, or focal deformity at the thoracic spine. Early facet arthropathy is present. 1.  Respiratory motion artifact decreases sensitivity in the distal lower lobe branches. However no convincing evidence for pulmonary arterial embolism. 2.  Minimal atelectatic changes in the lower lungs and has calcified lymph nodes at the mediastinum and right hilum. 3.  A 3 mm and less than 2 mm area of pleural based nodularity along the margin of the middle lobe would not require further workup in a low risk patient. Most likely related to the old granulomatous disease given the calcified lymph nodes. Fleischner Society guidelines for follow-up and management of incidentally detected pulmonary nodules: Multiple Solid Nodules: Nodule size less than 6 mm In a low-risk patient, no routine follow-up. In a high-risk patient, optional CT at 12 months. - Low risk patients include individuals with minimal or absent history of smoking and other known risk factors. - High risk patients include individuals with a history or smoking or known risk factors. Radiology 2017 http://pubs. rsna.org/doi/full/10.1148/radiol. 6924265873       CBC:   Recent Labs     11/19/22  1700 11/21/22  0443   WBC 7.1 8.4   HGB 12.6 11.1*    195     BMP:    Recent Labs     11/19/22  1700 11/21/22  0443   * 134*   K 3.7 4.3   CL 95* 103   CO2 24 22   BUN 7 9   CREATININE 0.5* 0.5*   GLUCOSE 227* 295*     Hepatic:   Recent Labs     11/19/22  1700 11/21/22  0443   AST 26 15   ALT 18 14   BILITOT 0.4 0.2   ALKPHOS 121 94     Lipids: No results found for: CHOL, HDL, TRIG  Hemoglobin A1C: No results found for: LABA1C  TSH: No results found for: TSH  Troponin:   Lab Results   Component Value Date/Time    TROPONINT <0.010 11/19/2022 05:00 PM    TROPONINT <0.010 02/08/2015 02:47 PM     Lactic Acid: No results for input(s): LACTA in the last 72 hours.  BNP:   Recent Labs     11/19/22  1700   PROBNP 30.95     UA:  Lab Results   Component Value Date/Time    NITRU POSITIVE 11/19/2022 07:30 PM    COLORU YELLOW 11/19/2022 07:30 PM    CLARITYU CLEAR 11/19/2022 07:30 PM    SPECGRAV <1.005 11/19/2022 07:30 PM    LEUKOCYTESUR NEGATIVE 11/19/2022 07:30 PM    UROBILINOGEN 1.0 11/19/2022 07:30 PM    BILIRUBINUR NEGATIVE 11/19/2022 07:30 PM    BLOODU NEGATIVE 11/19/2022 07:30 PM    KETUA 40 11/19/2022 07:30 PM     Urine Cultures: No results found for: LABURIN  Blood Cultures: No results found for: BC  No results found for: BLOODCULT2  Organism: No results found for: ORG    Time Spent Discharging patient 35 minutes    Electronically signed by Fatou Gonzalez MD on 11/22/2022 at 2:31 PM

## 2022-11-22 NOTE — PLAN OF CARE
Problem: Pain  Goal: Verbalizes/displays adequate comfort level or baseline comfort level  11/22/2022 1331 by Maricarmen Agarwal LPN  Outcome: Adequate for Discharge  Flowsheets (Taken 11/22/2022 0730)  Verbalizes/displays adequate comfort level or baseline comfort level: Encourage patient to monitor pain and request assistance  11/22/2022 0211 by Zenaida Thayer RN  Outcome: Progressing     Problem: Safety - Adult  Goal: Free from fall injury  11/22/2022 1331 by Maricarmen Agarwal LPN  Outcome: Adequate for Discharge  11/22/2022 0211 by Zenaida Thayer RN  Outcome: Progressing     Problem: ABCDS Injury Assessment  Goal: Absence of physical injury  11/22/2022 1331 by Maricarmen Agarwal LPN  Outcome: Adequate for Discharge  11/22/2022 0211 by Zenaida Thayer RN  Outcome: Progressing     Problem: Nutrition Deficit:  Goal: Optimize nutritional status  Outcome: Adequate for Discharge

## 2022-11-22 NOTE — PROGRESS NOTES
Comprehensive Nutrition Assessment    Type and Reason for Visit:  Initial    Nutrition Recommendations/Plan:   Continue current diet as ordered  Add vanilla and strawberry standard oral nutrition supplements  Will monitor po intake, weight trends, poc     Malnutrition Assessment:  Malnutrition Status:  Insufficient data (11/22/22 1223)    Context:  Acute Illness       Nutrition Assessment:    Pt admitted for syncope and collapse, hypoxia, covid-19, PMH: GERD, pt currently on regular diet, no po intake documented at this time, spoke with SLP regarding patient and swallowing difficulty, per SLP swallowing issues likely d/t illness, will add strawberry and vanilla oral nutrition supplements, no wounds noted, no weight hx available,  will follow at high nutrition risk    Nutrition Related Findings:    Glucose 295 Wound Type: None       Current Nutrition Intake & Therapies:    Average Meal Intake: Unable to assess  Average Supplements Intake: Unable to assess  ADULT DIET; Regular    Anthropometric Measures:  Height: 5' 5.97\" (167.6 cm)  Ideal Body Weight (IBW): 130 lbs (59 kg)       Current Body Weight: 195 lb 1.7 oz (88.5 kg), 150.1 % IBW. Weight Source: Bed Scale  Current BMI (kg/m2): 31.5        Weight Adjustment For: No Adjustment                 BMI Categories: Obese Class 1 (BMI 30.0-34. 9)    Estimated Daily Nutrient Needs:  Energy Requirements Based On: Formula  Weight Used for Energy Requirements: Current  Energy (kcal/day): 2363-7089 (Joann Zabrina w/ stress factor 1.0-1.2)  Weight Used for Protein Requirements: Ideal  Protein (g/day): 65-77 (1.1-1.3 g/kg)  Method Used for Fluid Requirements: 1 ml/kcal  Fluid (ml/day): 4832-0011    Nutrition Diagnosis:   Inadequate oral intake related to acute injury/trauma, swallowing difficulty as evidenced by intake 0-25%    Nutrition Interventions:   Food and/or Nutrient Delivery: Continue Current Diet, Start Oral Nutrition Supplement  Nutrition Education/Counseling: No recommendation at this time  Coordination of Nutrition Care: Continue to monitor while inpatient       Goals:     Goals: PO intake 50% or greater, within 2 days       Nutrition Monitoring and Evaluation:   Behavioral-Environmental Outcomes: None Identified  Food/Nutrient Intake Outcomes: Supplement Intake, Food and Nutrient Intake  Physical Signs/Symptoms Outcomes: Biochemical Data, GI Status, Hemodynamic Status, Fluid Status or Edema, Weight, Meal Time Behavior    Discharge Planning:     Too soon to determine     Gilberto Novak J[address], [address],   Contact: [phone]

## 2022-11-22 NOTE — FLOWSHEET NOTE
PT IV removed w/o difficulty. PT discharging home for self care. PT daughter picking up patient and transporting PT in POV.

## 2022-11-23 ENCOUNTER — CARE COORDINATION (OUTPATIENT)
Dept: CASE MANAGEMENT | Age: 52
End: 2022-11-23

## 2022-11-23 NOTE — CARE COORDINATION
Care Transitions Outreach Attempt    Call within 2 business days of discharge: Yes   Patient: Pam Goncalves Patient : 1970 MRN: [id number]    Last Discharge [address]       Date Complaint Diagnosis Description Type Department Provider    22 Loss of Consciousness Syncope and collapse . .. ED to Hosp-Admission (Discharged) (ADMITTED) [facility] CVICU Teri Wallis MD; Dallin Parks,... Was this an external facility discharge? No Discharge Facility: [facility]    Noted following upcoming appointments from discharge chart review:   [address] follow up appointment(s):   Future Appointments   Date Time Provider Matthias Butr   2022  3:20 Aleksander Mckee MD [place]     1st attempt to reach for Covid 19 Monitoring discharge call unsuccessful. HIPAA compliant message left requesting call back. UTR letter to Patient via [address]     Challenges to be reviewed by the provider   Additional needs identified to be addressed with provider:  22-22 [facility] for Covid 19  Please contact for scheduling of 7 day TCM appt       Method of communication with provider : chart routing

## 2022-11-25 ENCOUNTER — CARE COORDINATION (OUTPATIENT)
Dept: CASE MANAGEMENT | Age: 52
End: 2022-11-25

## 2022-11-25 DIAGNOSIS — U07.1 COVID: Primary | ICD-10-CM

## 2022-11-25 NOTE — CARE COORDINATION
[facility] Care Transitions Initial Follow Up Call    Call within 2 business days of discharge: Yes    Care Transition Nurse contacted the patient by telephone to perform post hospital discharge assessment. Verified name and  with patient as identifiers. Provided introduction to self, and explanation of the Care Transition Nurse role. Patient: Jessica Tomlinson Patient : 1970   MRN: [id number]  Reason for Admission: COVID positive, Syncope/collapse  Discharge Date: 22 RARS: Readmission Risk Score: 8.9      Last Discharge [address]       Date Complaint Diagnosis Description Type Department Provider    22 Loss of Consciousness Syncope and collapse . .. ED to Hosp-Admission (Discharged) (ADMITTED) [facility] CVICU Nancy Nelson MD; Alexi Lopez,... Was this an external facility discharge? No Discharge Facility: [facility]     Challenges to be reviewed by the provider   Additional needs identified to be addressed with provider: No  none               Method of communication with provider: none. Contacted patient for initial COVID transition follow up. Tiffany  states she is slowly getting better. Denies having any c/o chest pain/discomfort, pressure, tightness, fever, chills, headaches. Reports she does become short of breath when exerting herself and up for long periods. She does not have a pulse oximeter. Continues to have a cough, congestion and fatigue. Symptoms have not worsened. Reports her voice is slowly improving. Denies having any further syncopal or near syncopal episodes. Appetite slowly increasing. Encouraged to stay hydrated. Reviewed medication list.  She confirmed she picked up the new medications. She does not have any questions regarding her medications. Patient has not scheduled a follow up with PCP. Discussed importance of 7 day follow up appointment. She is agreeable to have CTN schedule the appointment. She is available any day and time. CTN will call her back with the information. She does not have any other questions or needs at this time. CTN attempted to contact PCP office. Recording states the office is closed. CTN called patient back to let her know the office is closed. Kimberly Nurse states she will call the office on Monday, 11/28/22 to schedule appointment. No questions or needs at this time. Care Transition Nurse reviewed discharge instructions with patient who verbalized understanding. The patient was given an opportunity to ask questions and does not have any further questions or concerns at this time. Were discharge instructions available to patient? Yes. Reviewed appropriate site of care based on symptoms and resources available to patient including: PCP. The patient agrees to contact the PCP office for questions related to their healthcare. Advance Care Planning:   Does patient have an Advance Directive: not on file. Medication reconciliation was performed with patient, who verbalizes understanding of administration of home medications. Medications reviewed, 1111F entered: yes    Was patient discharged with a pulse oximeter? no    Non-face-to-face services provided:  Obtained and reviewed discharge summary and/or continuity of care documents    Offered patient enrollment in the Remote Patient Monitoring (RPM) program for in-home monitoring: NA.    Care Transitions 24 Hour Call    Do you have a copy of your discharge instructions?: Yes  Do you have all of your prescriptions and are they filled?: Yes  Have you been contacted by a [address]?: No  Have you scheduled your follow up appointment?: No  Do you feel like you have everything you need to keep you well at home?: Yes  Care Transitions Interventions         Follow Up  Future Appointments   Date Time Provider Matthias Burt   12/29/2022  3:20 PM Kyree Garrido MD [address] [place] [address]       Care Transition Nurse provided contact information.   Plan for follow-up call in 3-5 days based on severity of symptoms and risk factors.   Plan for next call: symptom management-cough, congestion, SOB, fatigue  follow-up appointment-ensure follow up with PCP has been scheduled    Houston Goldman RN

## 2022-11-29 ENCOUNTER — TELEPHONE (OUTPATIENT)
Dept: FAMILY MEDICINE CLINIC | Age: 52
End: 2022-11-29

## 2022-11-29 NOTE — TELEPHONE ENCOUNTER
Care Transitions Initial Follow Up Call    Outreach made within 2 business days of discharge: Yes    Patient: Lina Eaton Patient : 1970   MRN: [id number]  Reason for Admission: There are no discharge diagnoses documented for the most recent discharge. Discharge Date: 22       Spoke with: patient    Discharge department/facility: [facility]    TCM Interactive Patient Contact:  Was patient able to fill all prescriptions: Yes  Was patient instructed to bring all medications to the follow-up visit: Yes  Is patient taking all medications as directed in the discharge summary?  Yes  Does patient understand their discharge instructions: Yes  Does patient have questions or concerns that need addressed prior to 7-14 day follow up office visit: no    Scheduled appointment with PCP within 7-14 days    Follow Up  Future Appointments   Date Time Provider Matthias Burt   2022  3:20 PM Sirena Burroughs MD [address] [place] [address]

## 2022-12-01 ENCOUNTER — CARE COORDINATION (OUTPATIENT)
Dept: CASE MANAGEMENT | Age: 52
End: 2022-12-01

## 2022-12-01 NOTE — CARE COORDINATION
[facility] Care Transitions Follow Up Call    Patient: Keri Pearl  Patient : 1970   MRN: <[id number]>  Reason for Admission: COVID  Discharge Date: 22 RARS: Readmission Risk Score: 8.9    Message left in compliance with HIPPA. Stated who I was, representing the [facility], Care Transitions Team. Instructed to call PCP with any immediate health needs/questions/concerns. Care transitions will continue to follow.      Follow Up  Future Appointments   Date Time Provider Matthias Burt   2022 11:00 AM Anamaria Leslie MD [facility]   2022  3:20 PM Anamaria Leslie MD [address] [place] [address]       Sydnee Sauceda RN

## 2022-12-02 ENCOUNTER — CARE COORDINATION (OUTPATIENT)
Dept: CASE MANAGEMENT | Age: 52
End: 2022-12-02

## 2022-12-02 NOTE — CARE COORDINATION
Care Transitions Outreach Attempt    Patient: Alice Tucker Patient : 1970 MRN: [id number]    Last Discharge [address]       Date Complaint Diagnosis Description Type Department Provider    22 Loss of Consciousness Syncope and collapse . .. ED to Hosp-Admission (Discharged) (ADMITTED) [facility] CVICU Nikia Medel MD; Connie Bennett,... Noted following upcoming appointments from discharge chart review:   [facility] follow up appointment(s):   Future Appointments   Date Time Provider Matthias Burt   2022 11:00 AM Kelly Page MD [facility]   2022  3:20 PM Kelly Page MD [facility]     2nd unsuccessful attempt to reach for Care Transition follow up call. Episode closed per protocol, no further outreach scheduled.

## 2022-12-06 ENCOUNTER — OFFICE VISIT (OUTPATIENT)
Dept: FAMILY MEDICINE CLINIC | Age: 52
End: 2022-12-06
Payer: COMMERCIAL

## 2022-12-06 VITALS
HEIGHT: 66 IN | DIASTOLIC BLOOD PRESSURE: 78 MMHG | OXYGEN SATURATION: 98 % | WEIGHT: 188 LBS | BODY MASS INDEX: 30.22 KG/M2 | HEART RATE: 76 BPM | SYSTOLIC BLOOD PRESSURE: 112 MMHG

## 2022-12-06 DIAGNOSIS — U07.1 COVID: ICD-10-CM

## 2022-12-06 DIAGNOSIS — J96.01 ACUTE RESPIRATORY FAILURE WITH HYPOXIA (HCC): ICD-10-CM

## 2022-12-06 DIAGNOSIS — E53.8 B12 DEFICIENCY: ICD-10-CM

## 2022-12-06 DIAGNOSIS — R73.9 HYPERGLYCEMIA: ICD-10-CM

## 2022-12-06 DIAGNOSIS — G93.31 POSTVIRAL FATIGUE SYNDROME: ICD-10-CM

## 2022-12-06 DIAGNOSIS — R00.2 PALPITATION: ICD-10-CM

## 2022-12-06 DIAGNOSIS — Z12.11 SCREEN FOR COLON CANCER: ICD-10-CM

## 2022-12-06 DIAGNOSIS — Z12.31 ENCOUNTER FOR SCREENING MAMMOGRAM FOR MALIGNANT NEOPLASM OF BREAST: ICD-10-CM

## 2022-12-06 DIAGNOSIS — Z09 HOSPITAL DISCHARGE FOLLOW-UP: Primary | ICD-10-CM

## 2022-12-06 DIAGNOSIS — R55 SYNCOPE, UNSPECIFIED SYNCOPE TYPE: ICD-10-CM

## 2022-12-06 DIAGNOSIS — N91.2 AMENORRHEA: ICD-10-CM

## 2022-12-06 DIAGNOSIS — F41.1 GAD (GENERALIZED ANXIETY DISORDER): ICD-10-CM

## 2022-12-06 DIAGNOSIS — E11.65 TYPE 2 DIABETES MELLITUS WITH HYPERGLYCEMIA, WITHOUT LONG-TERM CURRENT USE OF INSULIN (HCC): ICD-10-CM

## 2022-12-06 LAB
FOLATE: 3.73 NG/ML (ref 4.78–24.2)
FOLLICLE STIMULATING HORMONE: 6.3 MIU/ML
HBA1C MFR BLD: 12.1 %
LUTEINIZING HORMONE: 7.7 MIU/ML
TSH SERPL DL<=0.05 MIU/L-ACNC: 1.83 UIU/ML (ref 0.27–4.2)
VITAMIN B-12: 651 PG/ML (ref 211–911)

## 2022-12-06 PROCEDURE — 1111F DSCHRG MED/CURRENT MED MERGE: CPT | Performed by: FAMILY MEDICINE

## 2022-12-06 PROCEDURE — 99214 OFFICE O/P EST MOD 30 MIN: CPT | Performed by: FAMILY MEDICINE

## 2022-12-06 PROCEDURE — 83036 HEMOGLOBIN GLYCOSYLATED A1C: CPT | Performed by: FAMILY MEDICINE

## 2022-12-06 PROCEDURE — 36415 COLL VENOUS BLD VENIPUNCTURE: CPT | Performed by: FAMILY MEDICINE

## 2022-12-06 RX ORDER — LANCETS
1 EACH MISCELLANEOUS 2 TIMES DAILY
Qty: 60 EACH | Refills: 12 | Status: SHIPPED | OUTPATIENT
Start: 2022-12-06

## 2022-12-06 RX ORDER — METOPROLOL TARTRATE 50 MG/1
TABLET, FILM COATED ORAL
Qty: 60 TABLET | Refills: 5 | Status: SHIPPED | OUTPATIENT
Start: 2022-12-06

## 2022-12-06 RX ORDER — OMEPRAZOLE 20 MG/1
20 CAPSULE, DELAYED RELEASE ORAL DAILY
Qty: 30 CAPSULE | Refills: 5 | Status: SHIPPED | OUTPATIENT
Start: 2022-12-06

## 2022-12-06 RX ORDER — METFORMIN HYDROCHLORIDE 500 MG/1
1000 TABLET, EXTENDED RELEASE ORAL
Qty: 60 TABLET | Refills: 1 | Status: SHIPPED | OUTPATIENT
Start: 2022-12-06

## 2022-12-06 RX ORDER — ERGOCALCIFEROL 1.25 MG/1
50000 CAPSULE ORAL WEEKLY
Qty: 4 CAPSULE | Refills: 5 | Status: SHIPPED | OUTPATIENT
Start: 2022-12-06 | End: 2022-12-30

## 2022-12-06 RX ORDER — BLOOD-GLUCOSE METER
1 EACH MISCELLANEOUS 2 TIMES DAILY
Qty: 1 KIT | Refills: 0 | Status: SHIPPED | OUTPATIENT
Start: 2022-12-06

## 2022-12-06 RX ORDER — ESCITALOPRAM OXALATE 20 MG/1
10 TABLET ORAL DAILY
Qty: 30 TABLET | Refills: 5 | Status: SHIPPED | OUTPATIENT
Start: 2022-12-06

## 2022-12-06 SDOH — ECONOMIC STABILITY: FOOD INSECURITY: WITHIN THE PAST 12 MONTHS, YOU WORRIED THAT YOUR FOOD WOULD RUN OUT BEFORE YOU GOT MONEY TO BUY MORE.: PATIENT DECLINED

## 2022-12-06 SDOH — ECONOMIC STABILITY: FOOD INSECURITY: WITHIN THE PAST 12 MONTHS, THE FOOD YOU BOUGHT JUST DIDN'T LAST AND YOU DIDN'T HAVE MONEY TO GET MORE.: PATIENT DECLINED

## 2022-12-06 ASSESSMENT — PATIENT HEALTH QUESTIONNAIRE - PHQ9
SUM OF ALL RESPONSES TO PHQ QUESTIONS 1-9: 0
1. LITTLE INTEREST OR PLEASURE IN DOING THINGS: 0
SUM OF ALL RESPONSES TO PHQ QUESTIONS 1-9: 0
2. FEELING DOWN, DEPRESSED OR HOPELESS: 0
SUM OF ALL RESPONSES TO PHQ QUESTIONS 1-9: 0
SUM OF ALL RESPONSES TO PHQ9 QUESTIONS 1 & 2: 0
SUM OF ALL RESPONSES TO PHQ QUESTIONS 1-9: 0

## 2022-12-06 ASSESSMENT — SOCIAL DETERMINANTS OF HEALTH (SDOH): HOW HARD IS IT FOR YOU TO PAY FOR THE VERY BASICS LIKE FOOD, HOUSING, MEDICAL CARE, AND HEATING?: PATIENT DECLINED

## 2022-12-06 NOTE — PROGRESS NOTES
Is following up today on admission to [address] [place] [address] on 11/19/2022. She was diagnosed with hypoxia initially, syncopal episode, suspect COVID infection because SVT episode. Discharge paperwork is reviewed in detail. She has actually been seen once ever by myself on 5/14/2020, more than 2 years ago. She is on multiple medications. She is here on post hospital day 14, so is eligible for TCM service, with multiple attempts that transitional follow-up phone call, she is finally reached and spoken to by Alberto on 11/25/2022. That call was reviewed today. She was having trouble with speech and slow return of appetite. She had cough congestion and fatigue continuing. Post-Discharge Transitional Care Follow Up      Gus Kasper   YOB: 1970    Date of Office Visit:  12/6/2022  Date of Hospital Admission: 11/19/22  Date of Hospital Discharge: 11/22/22  Readmission Risk Score (high >=14%. Medium >=10%):Readmission Risk Score: 8.9    Chief Complaint   Patient presents with    Follow-Up from Hospital     Where she was hospitalized with respiratory distress and syncope related to a COVID illness    Hypertension     On medication, BP is adequately controlled today, labs reviewed from the hospital    Anxiety     On chronic medication, doing well on current dose without side effects    Other     B12 deficiency previously.  Previous PCP started her on once a week but she has only been taking once a month feels like does better with once a week    Amenorrhea     No period now for 4 months, thinks perimenopausal          Care management risk score Rising risk (score 2-5) and Complex Care (Scores >=6): No Risk Score On File     Non face to face  following discharge, date last encounter closed (first attempt may have been earlier): *No documented post hospital discharge outreach found in the last 14 days     Call initiated 2 business days of discharge: *No response recorded in the last 14 days     HANYGODWIN CARBAJAL  ROBERTO discharge follow-up  -     NE DISCHARGE MEDS RECONCILED W/ CURRENT OUTPATIENT MED LIST  Acute respiratory failure with hypoxia (HCC)  COVID  Syncope, unspecified syncope type  EMIR (generalized anxiety disorder)  -     escitalopram (LEXAPRO) 20 MG tablet; Take 0.5 tablets by mouth daily, Disp-30 tablet, R-5Normal  Palpitation  -     metoprolol tartrate (LOPRESSOR) 50 MG tablet; TAKE ONE TABLET BY MOUTH TWICE A DAY, Disp-60 tablet, R-5Normal  Encounter for screening mammogram for malignant neoplasm of breast  -     GILMER DIGITAL SCREEN W CAD BILATERAL PER PROTOCOL; Future  Hyperglycemia  -     POCT glycosylated hemoglobin (Hb A1C)  Screen for colon cancer  -     Fecal DNA Colorectal cancer screening (Cologuard)  Postviral fatigue syndrome  B12 deficiency  -     Vitamin B12 & Folate  Amenorrhea  -     Follicle Stimulating Hormone  -     Luteinizing Hormone  -     TSH  Type 2 diabetes mellitus with hyperglycemia, without long-term current use of insulin (HCC)  -     Blood Glucose Monitoring Suppl (ONE TOUCH ULTRA 2) w/Device KIT; 2 times daily Starting Tue 12/6/2022, Disp-1 kit, R-0, PrintSUB ANY COVERED  -     ONE TOUCH ULTRASOFT LANCETS MISC; 2 TIMES DAILY Starting Tue 12/6/2022, Disp-60 each, R-12, PrintCheck sugar once daily rotate times. MAY SUB ANY COVERED  -     blood glucose test strips (ASCENSIA AUTODISC VI;ONE TOUCH ULTRA TEST VI) strip; 2 TIMES DAILY Starting Tue 12/6/2022, Disp-60 each, R-5, PrintDaily as needed rotates times qac and post prandial 2 hours. SUB ANY COVERED      Medical Decision Making: moderate complexity  Return in about 2 months (around 2/6/2023) for recheck on sugar, bring readings. On this date 12/6/2022 I have spent 20 minutes reviewing previous notes, test results and face to face with the patient discussing the diagnosis and importance of compliance with the treatment plan as well as documenting on the day of the visit.        Subjective:   HPI    Inpatient course: Discharge summary reviewed- see chart. Interval history/Current status: improved, not to baseline, new and old issues remain for f-u    Patient Active Problem List   Diagnosis    Syncopal episodes    Acute respiratory failure with hypoxia ([facility] [address]    [address] discharge follow-up       Medications listed as ordered at the time of discharge from hospital     Medication List            Accurate as of December 6, 2022 11:59 PM. If you have any questions, ask your nurse or doctor. START taking these medications      blood glucose test strips strip  Commonly known as: ASCENSIA AUTODISC VI;ONE TOUCH ULTRA TEST VI  1 each by In Vitro route 2 times daily Daily as needed rotates times qac and post prandial 2 hours. SUB ANY COVERED  Started by: Loanne Schwab, MD     metFORMIN 500 MG extended release tablet  Commonly known as: Glucophage XR  Take 2 tablets by mouth daily (with breakfast) Start with 1 daily at breakfast for 3 days then increase to 2 daily at breakfast  Started by: Loanne Schwab, MD     [address] 2 w/Device Kit  1 kit by Does not apply route in the morning and at bedtime SUB ANY COVERED  Started by: Loanne Schwab, MD     ONE TOUCH ULTRASOFT LANCETS Misc  1 each by Does not apply route 2 times daily Check sugar once daily rotate times. MAY SUB ANY COVERED  Started by: Loanne Schwab, MD     vitamin D 1.25 MG (00694 UT) Caps capsule  Commonly known as: ERGOCALCIFEROL  Take 1 capsule by mouth once a week  Started by: Loanne Schwab, MD            CONTINUE taking these medications      B-D 3CC LUER-BIBIANA SYR 25GX1\" 25G X 1\" 3 ML Misc  Generic drug: SYRINGE-NEEDLE (DISP) 3 ML  USE INITIALLY THREE TIMES A WEEK FOR VITAMIN B12 (CYANOCOBALAMIN) INJECTION DOSING, THEN EVENTUALLY TITRATE TO ONCE A MONTH DOSING AS DIRECTED.     cyanocobalamin 1000 MCG/ML injection  INJECT 1ML AS DIRECTED THREE TIMES A WEEK FOR 2 WEEKS, THEN TWICE A WEEK FOR 2 WEEKS, THEN ONCE A WEEK FOR 3 MONTHS, THEN ONCE A MONTH THEREAFTER. escitalopram 20 MG tablet  Commonly known as: Lexapro  Take 0.5 tablets by mouth daily     metoprolol tartrate 50 MG tablet  Commonly known as: LOPRESSOR  TAKE ONE TABLET BY MOUTH TWICE A DAY     omeprazole 20 MG delayed release capsule  Commonly known as: PRILOSEC  Take 1 capsule by mouth daily            STOP taking these medications      albuterol sulfate  (90 Base) MCG/ACT inhaler  Commonly known as: PROVENTIL;VENTOLIN;PROAIR  Stopped by: Justus Gooden MD     Benzocaine-Menthol 6-10 MG Lozg lozenge  Commonly known as: CEPACOL  Stopped by: Justus Gooden MD     ibuprofen 400 MG tablet  Commonly known as: ADVIL;MOTRIN  Stopped by: Justus Gooden MD               Where to Get Your Medications        These medications were sent to [facility] [id number] [facility], [address] [facility] [address] - P [phone] - F [phone]  [address] [place] [address]      Phone: [phone]   escitalopram 20 MG tablet  metFORMIN 500 MG extended release tablet  metoprolol tartrate 50 MG tablet  omeprazole 20 MG delayed release capsule  vitamin D 1.25 MG (76064 UT) Caps capsule       You can get these medications from any pharmacy    Bring a paper prescription for each of these medications  blood glucose test strips strip  ONE TOUCH ULTRA 2 w/Device Kit  ONE TOUCH ULTRASOFT LANCETS Misc          Medications marked \"taking\" at this time  Outpatient Medications Marked as Taking for the 12/6/22 encounter (Office Visit) with Justus Gooden MD   Medication Sig Dispense Refill    escitalopram (LEXAPRO) 20 MG tablet Take 0.5 tablets by mouth daily 30 tablet 5    omeprazole (PRILOSEC) 20 MG delayed release capsule Take 1 capsule by mouth daily 30 capsule 5    metoprolol tartrate (LOPRESSOR) 50 MG tablet TAKE ONE TABLET BY MOUTH TWICE A DAY 60 tablet 5    vitamin D (ERGOCALCIFEROL) 1.25 MG (08660 UT) CAPS capsule Take 1 capsule by mouth once a week 4 capsule 5    metFORMIN (GLUCOPHAGE XR) 500 MG extended release tablet Take 2 tablets by mouth daily (with breakfast) Start with 1 daily at breakfast for 3 days then increase to 2 daily at breakfast 60 tablet 1    Blood Glucose Monitoring Suppl (ONE TOUCH ULTRA 2) w/Device KIT 1 kit by Does not apply route in the morning and at bedtime SUB ANY COVERED 1 kit 0    ONE TOUCH ULTRASOFT LANCETS MISC 1 each by Does not apply route 2 times daily Check sugar once daily rotate times. MAY SUB ANY COVERED 60 each 12    blood glucose test strips (ASCENSIA AUTODISC VI;ONE TOUCH ULTRA TEST VI) strip 1 each by In Vitro route 2 times daily Daily as needed rotates times qac and post prandial 2 hours. SUB ANY COVERED 60 each 5    SYRINGE-NEEDLE, DISP, 3 ML (B-D 3CC LUER-BIBIANA SYR 25GX1\") 25G X 1\" 3 ML MISC USE INITIALLY THREE TIMES A WEEK FOR VITAMIN B12 (CYANOCOBALAMIN) INJECTION DOSING, THEN EVENTUALLY TITRATE TO ONCE A MONTH DOSING AS DIRECTED. 10 each 0    cyanocobalamin 1000 MCG/ML injection INJECT 1ML AS DIRECTED THREE TIMES A WEEK FOR 2 WEEKS, THEN TWICE A WEEK FOR 2 WEEKS, THEN ONCE A WEEK FOR 3 MONTHS, THEN ONCE A MONTH THEREAFTER. 10 mL 0        Medications patient taking as of now reconciled against medications ordered at time of hospital discharge: Yes    Review of Systems   Constitutional:  Positive for activity change (still weak) and appetite change (reduced appetite and po intake). Negative for chills, fatigue and fever. HENT:  Positive for congestion. Respiratory:  Positive for shortness of breath. Negative for cough, chest tightness and wheezing. Cardiovascular:  Negative for chest pain and leg swelling. Gastrointestinal:  Negative for abdominal pain. Musculoskeletal:  Negative for back pain and myalgias. Skin:  Negative for rash. Psychiatric/Behavioral:  Negative for behavioral problems and dysphoric mood. The patient is nervous/anxious.       Objective:    /78 (Site: Left Upper Arm, Position: Sitting, Cuff Size: Medium Adult)   Pulse 76   Ht 5' 6\" (1.676 m)   Wt 188 lb (85.3 kg)   LMP 08/15/2022 (Approximate)   SpO2 98%   BMI 30.34 kg/m²   Physical Exam  Vitals and nursing note reviewed. Constitutional:       General: She is not in acute distress. Appearance: She is well-developed. She is ill-appearing. She is not diaphoretic. HENT:      Head: Normocephalic. Eyes:      General:         Right eye: No discharge. Left eye: No discharge. Conjunctiva/sclera: Conjunctivae normal.      Pupils: Pupils are equal, round, and reactive to light. Cardiovascular:      Rate and Rhythm: Normal rate and regular rhythm. Heart sounds: Normal heart sounds. No murmur heard. Pulmonary:      Effort: Pulmonary effort is normal. No respiratory distress. Breath sounds: Normal breath sounds. No wheezing or rales. Musculoskeletal:      Cervical back: Normal range of motion. Skin:     General: Skin is warm and dry. Neurological:      Mental Status: She is alert and oriented to person, place, and time. Psychiatric:         Behavior: Behavior normal.       An electronic signature was used to authenticate this note.   --Justus Gooden MD

## 2022-12-21 NOTE — RESULT ENCOUNTER NOTE
Recent labs show mildly low folate, FSH and LH or female hormones are in normal range and suggest against any menopausal symptoms. TSH is normal.    Careful attention to nutrition and healthy eating and perhaps adding a women's multivitamin would be recommended and may improve the folate problem. Limiting alcohol to no more than 7 drinks a week or 2 drinks in a sitting both would be appropriate as sometimes low folate levels relate to alcohol use. Not routed for staff call but released to [facility] only.

## 2022-12-28 ASSESSMENT — ENCOUNTER SYMPTOMS
BACK PAIN: 0
COUGH: 0
SHORTNESS OF BREATH: 1
WHEEZING: 0
ABDOMINAL PAIN: 0
CHEST TIGHTNESS: 0

## 2022-12-30 RX ORDER — ERGOCALCIFEROL 1.25 MG/1
CAPSULE ORAL
Qty: 4 CAPSULE | Refills: 5 | Status: SHIPPED | OUTPATIENT
Start: 2022-12-30

## 2023-01-03 DIAGNOSIS — E11.65 TYPE 2 DIABETES MELLITUS WITH HYPERGLYCEMIA, WITHOUT LONG-TERM CURRENT USE OF INSULIN (HCC): ICD-10-CM

## 2023-01-03 RX ORDER — BLOOD-GLUCOSE METER
EACH MISCELLANEOUS
Qty: 1 KIT | Refills: 0 | Status: SHIPPED | OUTPATIENT
Start: 2023-01-03

## 2023-01-05 PROBLEM — Z09 HOSPITAL DISCHARGE FOLLOW-UP: Status: RESOLVED | Noted: 2022-12-06 | Resolved: 2023-01-05

## 2023-01-08 DIAGNOSIS — E53.8 B12 DEFICIENCY: ICD-10-CM

## 2023-01-08 DIAGNOSIS — R53.83 FATIGUE, UNSPECIFIED TYPE: ICD-10-CM

## 2023-01-09 RX ORDER — SYRINGE WITH NEEDLE, 1 ML 25GX5/8"
SYRINGE, EMPTY DISPOSABLE MISCELLANEOUS
Qty: 10 EACH | Refills: 2 | Status: SHIPPED | OUTPATIENT
Start: 2023-01-09

## 2023-01-09 RX ORDER — CYANOCOBALAMIN 1000 UG/ML
INJECTION, SOLUTION INTRAMUSCULAR; SUBCUTANEOUS
Qty: 10 ML | Refills: 0 | Status: SHIPPED | OUTPATIENT
Start: 2023-01-09

## 2023-02-01 RX ORDER — ERGOCALCIFEROL 1.25 MG/1
CAPSULE ORAL
Qty: 4 CAPSULE | Refills: 5 | Status: SHIPPED | OUTPATIENT
Start: 2023-02-01

## 2023-02-03 RX ORDER — METFORMIN HYDROCHLORIDE 500 MG/1
TABLET, EXTENDED RELEASE ORAL
Qty: 60 TABLET | Refills: 1 | Status: SHIPPED | OUTPATIENT
Start: 2023-02-03

## 2023-02-27 PROBLEM — G93.31 POSTVIRAL FATIGUE SYNDROME: Status: ACTIVE | Noted: 2023-02-27

## 2023-02-27 PROBLEM — E55.9 VITAMIN D DEFICIENCY: Status: ACTIVE | Noted: 2023-02-27

## 2023-02-27 PROBLEM — E11.65 TYPE 2 DIABETES MELLITUS WITH HYPERGLYCEMIA, WITHOUT LONG-TERM CURRENT USE OF INSULIN (HCC): Status: ACTIVE | Noted: 2023-02-27

## 2023-04-05 RX ORDER — METFORMIN HYDROCHLORIDE 500 MG/1
TABLET, EXTENDED RELEASE ORAL
Qty: 60 TABLET | Refills: 2 | Status: SHIPPED | OUTPATIENT
Start: 2023-04-05

## 2023-04-07 RX ORDER — OMEPRAZOLE 20 MG/1
CAPSULE, DELAYED RELEASE ORAL
Qty: 90 CAPSULE | Refills: 1 | Status: SHIPPED | OUTPATIENT
Start: 2023-04-07

## 2023-07-14 RX ORDER — METFORMIN HYDROCHLORIDE 500 MG/1
TABLET, EXTENDED RELEASE ORAL
Qty: 60 TABLET | Refills: 2 | Status: SHIPPED | OUTPATIENT
Start: 2023-07-14

## 2023-07-25 RX ORDER — METFORMIN HYDROCHLORIDE 500 MG/1
TABLET, EXTENDED RELEASE ORAL
Qty: 180 TABLET | Refills: 1 | Status: SHIPPED | OUTPATIENT
Start: 2023-07-25

## 2023-11-30 DIAGNOSIS — F41.1 GAD (GENERALIZED ANXIETY DISORDER): ICD-10-CM

## 2023-11-30 DIAGNOSIS — R00.2 PALPITATION: ICD-10-CM

## 2023-11-30 RX ORDER — ESCITALOPRAM OXALATE 20 MG/1
10 TABLET ORAL DAILY
Qty: 30 TABLET | Refills: 5 | Status: SHIPPED | OUTPATIENT
Start: 2023-11-30

## 2023-11-30 RX ORDER — METOPROLOL TARTRATE 50 MG/1
TABLET, FILM COATED ORAL
Qty: 60 TABLET | Refills: 5 | Status: SHIPPED | OUTPATIENT
Start: 2023-11-30

## 2024-01-26 ENCOUNTER — OFFICE VISIT (OUTPATIENT)
Dept: FAMILY MEDICINE CLINIC | Age: 54
End: 2024-01-26
Payer: COMMERCIAL

## 2024-01-26 VITALS
WEIGHT: 199 LBS | HEART RATE: 66 BPM | OXYGEN SATURATION: 96 % | BODY MASS INDEX: 32.12 KG/M2 | DIASTOLIC BLOOD PRESSURE: 72 MMHG | SYSTOLIC BLOOD PRESSURE: 122 MMHG

## 2024-01-26 DIAGNOSIS — Z12.11 SCREEN FOR COLON CANCER: ICD-10-CM

## 2024-01-26 DIAGNOSIS — R53.83 FATIGUE, UNSPECIFIED TYPE: ICD-10-CM

## 2024-01-26 DIAGNOSIS — E53.8 B12 DEFICIENCY: ICD-10-CM

## 2024-01-26 DIAGNOSIS — R00.2 PALPITATION: ICD-10-CM

## 2024-01-26 DIAGNOSIS — Z23 NEED FOR PNEUMOCOCCAL VACCINATION: ICD-10-CM

## 2024-01-26 DIAGNOSIS — F41.1 GAD (GENERALIZED ANXIETY DISORDER): ICD-10-CM

## 2024-01-26 DIAGNOSIS — E11.65 TYPE 2 DIABETES MELLITUS WITH HYPERGLYCEMIA, WITHOUT LONG-TERM CURRENT USE OF INSULIN (HCC): Primary | ICD-10-CM

## 2024-01-26 DIAGNOSIS — Z12.31 ENCOUNTER FOR SCREENING MAMMOGRAM FOR MALIGNANT NEOPLASM OF BREAST: ICD-10-CM

## 2024-01-26 DIAGNOSIS — Z23 NEED FOR CHICKENPOX VACCINATION: ICD-10-CM

## 2024-01-26 PROBLEM — J96.01 ACUTE RESPIRATORY FAILURE WITH HYPOXIA (HCC): Status: RESOLVED | Noted: 2022-11-20 | Resolved: 2024-01-26

## 2024-01-26 LAB — HBA1C MFR BLD: 7.3 %

## 2024-01-26 PROCEDURE — G8417 CALC BMI ABV UP PARAM F/U: HCPCS | Performed by: FAMILY MEDICINE

## 2024-01-26 PROCEDURE — 2022F DILAT RTA XM EVC RTNOPTHY: CPT | Performed by: FAMILY MEDICINE

## 2024-01-26 PROCEDURE — G8427 DOCREV CUR MEDS BY ELIG CLIN: HCPCS | Performed by: FAMILY MEDICINE

## 2024-01-26 PROCEDURE — 3051F HG A1C>EQUAL 7.0%<8.0%: CPT | Performed by: FAMILY MEDICINE

## 2024-01-26 PROCEDURE — 3017F COLORECTAL CA SCREEN DOC REV: CPT | Performed by: FAMILY MEDICINE

## 2024-01-26 PROCEDURE — G8484 FLU IMMUNIZE NO ADMIN: HCPCS | Performed by: FAMILY MEDICINE

## 2024-01-26 PROCEDURE — 83036 HEMOGLOBIN GLYCOSYLATED A1C: CPT | Performed by: FAMILY MEDICINE

## 2024-01-26 PROCEDURE — 99214 OFFICE O/P EST MOD 30 MIN: CPT | Performed by: FAMILY MEDICINE

## 2024-01-26 PROCEDURE — 1036F TOBACCO NON-USER: CPT | Performed by: FAMILY MEDICINE

## 2024-01-26 RX ORDER — ESCITALOPRAM OXALATE 20 MG/1
10 TABLET ORAL DAILY
Qty: 30 TABLET | Refills: 5 | Status: SHIPPED | OUTPATIENT
Start: 2024-01-26

## 2024-01-26 RX ORDER — CYANOCOBALAMIN 1000 UG/ML
1000 INJECTION, SOLUTION INTRAMUSCULAR; SUBCUTANEOUS
Qty: 2 ML | Refills: 5 | Status: SHIPPED | OUTPATIENT
Start: 2024-01-26

## 2024-01-26 RX ORDER — ERGOCALCIFEROL 1.25 MG/1
50000 CAPSULE ORAL WEEKLY
Qty: 4 CAPSULE | Refills: 5 | Status: SHIPPED | OUTPATIENT
Start: 2024-01-26

## 2024-01-26 RX ORDER — SEMAGLUTIDE 1.34 MG/ML
0.25 INJECTION, SOLUTION SUBCUTANEOUS WEEKLY
Qty: 1 ADJUSTABLE DOSE PRE-FILLED PEN SYRINGE | Refills: 1 | Status: SHIPPED | OUTPATIENT
Start: 2024-01-26

## 2024-01-26 RX ORDER — ZOSTER VACCINE RECOMBINANT, ADJUVANTED 50 MCG/0.5
0.5 KIT INTRAMUSCULAR SEE ADMIN INSTRUCTIONS
Qty: 0.5 ML | Refills: 0 | Status: CANCELLED | OUTPATIENT
Start: 2024-01-26 | End: 2024-07-24

## 2024-01-26 RX ORDER — METFORMIN HYDROCHLORIDE 500 MG/1
TABLET, EXTENDED RELEASE ORAL
Qty: 180 TABLET | Refills: 1 | Status: CANCELLED | OUTPATIENT
Start: 2024-01-26

## 2024-01-26 RX ORDER — LANCETS
1 EACH MISCELLANEOUS 2 TIMES DAILY
Qty: 60 EACH | Refills: 12 | Status: SHIPPED | OUTPATIENT
Start: 2024-01-26

## 2024-01-26 RX ORDER — SYRINGE WITH NEEDLE, 1 ML 25GX5/8"
SYRINGE, EMPTY DISPOSABLE MISCELLANEOUS
Qty: 10 EACH | Refills: 2 | Status: SHIPPED | OUTPATIENT
Start: 2024-01-26

## 2024-01-26 RX ORDER — OMEPRAZOLE 20 MG/1
20 CAPSULE, DELAYED RELEASE ORAL DAILY
Qty: 90 CAPSULE | Refills: 1 | Status: SHIPPED | OUTPATIENT
Start: 2024-01-26

## 2024-01-26 RX ORDER — METOPROLOL TARTRATE 50 MG/1
TABLET, FILM COATED ORAL
Qty: 60 TABLET | Refills: 5 | Status: SHIPPED | OUTPATIENT
Start: 2024-01-26

## 2024-01-26 ASSESSMENT — PATIENT HEALTH QUESTIONNAIRE - PHQ9
SUM OF ALL RESPONSES TO PHQ9 QUESTIONS 1 & 2: 0
SUM OF ALL RESPONSES TO PHQ QUESTIONS 1-9: 0
SUM OF ALL RESPONSES TO PHQ QUESTIONS 1-9: 0
1. LITTLE INTEREST OR PLEASURE IN DOING THINGS: 0
SUM OF ALL RESPONSES TO PHQ QUESTIONS 1-9: 0
SUM OF ALL RESPONSES TO PHQ QUESTIONS 1-9: 0
2. FEELING DOWN, DEPRESSED OR HOPELESS: 0

## 2024-01-26 NOTE — PROGRESS NOTES
ONE TABLET BY MOUTH TWICE A DAY 60 tablet 5    omeprazole (PRILOSEC) 20 MG delayed release capsule Take 1 capsule by mouth daily 90 capsule 1    blood glucose test strips (ASCENSIA AUTODISC VI;ONE TOUCH ULTRA TEST VI) strip 1 each by In Vitro route 2 times daily Daily as needed rotates times qac and post prandial 2 hours.  SUB ANY COVERED 60 each 5    SYRINGE-NEEDLE, DISP, 3 ML (B-D 3CC LUER-BIBIANA SYR 25GX1\") 25G X 1\" 3 ML MISC USE FOR CYANOCOBALAMIN INJECTIONS THREE TIMES WEEK AND THEN EVENTUALLY TITRATE TO ONCE A MONTH DOSING AS DIRECTED 10 each 2    ONE TOUCH ULTRASOFT LANCETS MISC 1 each by Does not apply route 2 times daily Check sugar once daily rotate times.  MAY SUB ANY COVERED 60 each 12    vitamin D (ERGOCALCIFEROL) 1.25 MG (96533 UT) CAPS capsule Take 1 capsule by mouth once a week 4 capsule 5    escitalopram (LEXAPRO) 20 MG tablet Take 0.5 tablets by mouth daily 30 tablet 5    cyanocobalamin 1000 MCG/ML injection Inject 1 mL into the muscle every 14 days 2 mL 5    Semaglutide,0.25 or 0.5MG/DOS, (OZEMPIC, 0.25 OR 0.5 MG/DOSE,) 2 MG/1.5ML SOPN Inject 0.25 mg into the skin once a week 1 Adjustable Dose Pre-filled Pen Syringe 1    metFORMIN (GLUCOPHAGE-XR) 500 MG extended release tablet TAKE ONE TABLET BY MOUTH DAILY AT BREAKFAST FOR 3 DAYS THEN INCREASE TO TWO TABLETS DAILY AT BREAKFAST 180 tablet 1    cyanocobalamin 1000 MCG/ML injection INJECT 1 ML AS DIRECTED THREE TIMES A WEEK FOR 2 WEEKS, THEN TWICE A WEEK FOR 2 WEEKS, THEN ONCE A WEEK FOR 3 MONTHS, THEN ONCE A MONTH THEREAFTER. 10 mL 0    Blood Glucose Monitoring Suppl (ONE TOUCH ULTRA 2) w/Device KIT USE TO TEST IN THE MORNING AND AT BEDTIME 1 kit 0       Tobacco use history updated.   Social History     Tobacco Use   Smoking Status Never   Smokeless Tobacco Never        Nursing note reviewed.    Vitals:    01/26/24 1542   BP: 122/72   Site: Left Upper Arm   Position: Sitting   Cuff Size: Medium Adult   Pulse: 66   SpO2: 96%   Weight: 90.3 kg (199 lb)

## 2024-01-27 ASSESSMENT — ENCOUNTER SYMPTOMS
WHEEZING: 0
CHEST TIGHTNESS: 0
SHORTNESS OF BREATH: 0
ABDOMINAL PAIN: 0
COUGH: 0
BACK PAIN: 0

## 2024-02-06 ENCOUNTER — TELEPHONE (OUTPATIENT)
Dept: FAMILY MEDICINE CLINIC | Age: 54
End: 2024-02-06

## 2024-02-06 NOTE — TELEPHONE ENCOUNTER
Pt called today. Pharmacy needs a PA for her Ozempic. Pharmacy stated they have sent the request several times. Please advise

## 2024-02-09 ENCOUNTER — TELEPHONE (OUTPATIENT)
Dept: FAMILY MEDICINE CLINIC | Age: 54
End: 2024-02-09

## 2024-02-09 NOTE — TELEPHONE ENCOUNTER
I will send her a message in [employer], we do they may not approve it because she was controlled on metformin.

## 2024-05-27 ENCOUNTER — APPOINTMENT (OUTPATIENT)
Dept: GENERAL RADIOLOGY | Age: 54
End: 2024-05-27

## 2024-05-27 ENCOUNTER — HOSPITAL ENCOUNTER (EMERGENCY)
Age: 54
Discharge: HOME | End: 2024-05-27
Attending: EMERGENCY MEDICINE

## 2024-05-27 VITALS
BODY MASS INDEX: 31.47 KG/M2 | SYSTOLIC BLOOD PRESSURE: 131 MMHG | HEART RATE: 85 BPM | OXYGEN SATURATION: 100 % | RESPIRATION RATE: 19 BRPM | WEIGHT: 195 LBS | DIASTOLIC BLOOD PRESSURE: 48 MMHG | TEMPERATURE: 98.2 F

## 2024-05-27 DIAGNOSIS — S80.02XA CONTUSION OF LEFT KNEE, INITIAL ENCOUNTER: Primary | ICD-10-CM

## 2024-05-27 PROCEDURE — 6370000000 HC RX 637 (ALT 250 FOR IP): Performed by: EMERGENCY MEDICINE

## 2024-05-27 PROCEDURE — 73562 X-RAY EXAM OF KNEE 3: CPT

## 2024-05-27 PROCEDURE — 99283 EMERGENCY DEPT VISIT LOW MDM: CPT

## 2024-05-27 RX ORDER — IBUPROFEN 600 MG/1
600 TABLET ORAL EVERY 6 HOURS PRN
Qty: 20 TABLET | Refills: 0 | Status: SHIPPED | OUTPATIENT
Start: 2024-05-27 | End: 2024-06-26

## 2024-05-27 RX ORDER — HYDROCODONE BITARTRATE AND ACETAMINOPHEN 5; 325 MG/1; MG/1
1 TABLET ORAL
Status: COMPLETED | OUTPATIENT
Start: 2024-05-27 | End: 2024-05-27

## 2024-05-27 RX ADMIN — HYDROCODONE BITARTRATE AND ACETAMINOPHEN 1 TABLET: 5; 325 TABLET ORAL at 01:44

## 2024-05-27 ASSESSMENT — PAIN DESCRIPTION - ORIENTATION: ORIENTATION: LEFT

## 2024-05-27 ASSESSMENT — PAIN SCALES - GENERAL
PAINLEVEL_OUTOF10: 8

## 2024-05-27 ASSESSMENT — PAIN - FUNCTIONAL ASSESSMENT
PAIN_FUNCTIONAL_ASSESSMENT: 0-10
PAIN_FUNCTIONAL_ASSESSMENT: 0-10

## 2024-05-27 ASSESSMENT — PAIN DESCRIPTION - LOCATION: LOCATION: KNEE

## 2024-05-27 NOTE — ED PROVIDER NOTES
Triage Chief Complaint:    Knee Pain (Fall on left knee)    JOSE Medrano is a 54 y.o. female that presents for evaluation after landing on her left knee.  She states that she has pain in the left knee.  No pain elsewhere.  No numbness tingling or weakness.  She noticed bruising across this area.  She has not had any medications prior to arrival.  No prior injuries.  Feels otherwise well denied hitting her head or losing consciousness.  No numbness tingling or weakness    History from : Patient    Limitations to history : None    ROS:  10 systems reviewed and negative except as above.     Past Medical History:   Diagnosis Date    Depression     Fibromyalgia     GERD (gastroesophageal reflux disease)     SVT (supraventricular tachycardia) ([facility])     Takes Metoprolol - previously saw Dr. Lemus, now PCP    Type 2 diabetes mellitus without complication ([facility])      Past Surgical History:   Procedure Laterality Date    DILATION AND CURETTAGE OF UTERUS N/A 3/31/2021    DILATATION AND CURETTAGE HYSTEROSCOPY performed by Jaxon Huff DO at [facility] OR    VENTRICULAR ABLATION SURGERY  2005     Family History   Problem Relation Age of Onset    Gout Mother      Social History     Socioeconomic History    Marital status:      Spouse name: Not on file    Number of children: Not on file    Years of education: Not on file    Highest education level: Not on file   Occupational History    Not on file   Tobacco Use    Smoking status: Never    Smokeless tobacco: Never   Vaping Use    Vaping Use: Never used   Substance and Sexual Activity    Alcohol use: Yes     Comment: 3-4 drinks a month    Drug use: No    Sexual activity: Yes   Other Topics Concern    Not on file   Social History Narrative    Not on file     Social Determinants of Health     Financial Resource Strain: Medium Risk (3/24/2023)    Overall Financial Resource Strain (CARDIA)     Difficulty of Paying Living Expenses: Somewhat hard   Food Insecurity:  Negative

## 2024-06-18 DIAGNOSIS — E11.65 TYPE 2 DIABETES MELLITUS WITH HYPERGLYCEMIA, WITHOUT LONG-TERM CURRENT USE OF INSULIN (HCC): ICD-10-CM

## 2024-06-18 DIAGNOSIS — R00.2 PALPITATION: ICD-10-CM

## 2024-06-18 DIAGNOSIS — F41.1 GAD (GENERALIZED ANXIETY DISORDER): ICD-10-CM

## 2024-06-18 DIAGNOSIS — R53.83 FATIGUE, UNSPECIFIED TYPE: ICD-10-CM

## 2024-06-18 NOTE — TELEPHONE ENCOUNTER
Last appointment: 1/26/2024   Next Appointment: 6/21/2024     Allergies:  No Known Allergies      Recent Vitals:  Wt Readings from Last 3 Encounters:   05/27/24 88.5 kg (195 lb)   01/26/24 90.3 kg (199 lb)   03/23/23 82.1 kg (181 lb)     Ht Readings from Last 3 Encounters:   12/06/22 1.676 m (5' 6\")   11/22/22 1.676 m (5' 5.97\")   03/31/21 1.676 m (5' 6\")     BP Readings from Last 3 Encounters:   05/27/24 (!) 131/48   01/26/24 122/72   03/23/23 118/62     BMI Readings from Last 3 Encounters:   05/27/24 31.47 kg/m²   01/26/24 32.12 kg/m²   03/23/23 29.21 kg/m²       Recent Labs__________________________________________________________________________    Renal:   Creatinine   Date Value Ref Range Status   11/21/2022 0.5 (L) 0.6 - 1.1 MG/DL Final     BUN   Date Value Ref Range Status   11/21/2022 9 6 - 23 MG/DL Final     Sodium   Date Value Ref Range Status   11/21/2022 134 (L) 135 - 145 MMOL/L Final     Potassium   Date Value Ref Range Status   11/21/2022 4.3 3.5 - 5.1 MMOL/L Final     Chloride   Date Value Ref Range Status   11/21/2022 103 99 - 110 mMol/L Final     CO2   Date Value Ref Range Status   11/21/2022 22 21 - 32 MMOL/L Final       BMP:    Sodium   Date Value Ref Range Status   11/21/2022 134 (L) 135 - 145 MMOL/L Final     Potassium   Date Value Ref Range Status   11/21/2022 4.3 3.5 - 5.1 MMOL/L Final     Chloride   Date Value Ref Range Status   11/21/2022 103 99 - 110 mMol/L Final     CO2   Date Value Ref Range Status   11/21/2022 22 21 - 32 MMOL/L Final     BUN   Date Value Ref Range Status   11/21/2022 9 6 - 23 MG/DL Final     Creatinine   Date Value Ref Range Status   11/21/2022 0.5 (L) 0.6 - 1.1 MG/DL Final     Glucose   Date Value Ref Range Status   11/21/2022 295 (H) 70 - 99 MG/DL Final     Calcium   Date Value Ref Range Status   11/21/2022 8.4 8.3 - 10.6 MG/DL Final     Est, Glom Filt Rate   Date Value Ref Range Status   11/21/2022 >60 >60 mL/min/1.73m2 Final     Comment:             Effective Oct

## 2024-06-20 RX ORDER — OMEPRAZOLE 20 MG/1
20 CAPSULE, DELAYED RELEASE ORAL DAILY
Qty: 90 CAPSULE | Refills: 1 | Status: SHIPPED | OUTPATIENT
Start: 2024-06-20

## 2024-06-20 RX ORDER — ESCITALOPRAM OXALATE 20 MG/1
10 TABLET ORAL DAILY
Qty: 30 TABLET | Refills: 5 | Status: SHIPPED | OUTPATIENT
Start: 2024-06-20

## 2024-06-20 RX ORDER — METFORMIN HYDROCHLORIDE 500 MG/1
TABLET, EXTENDED RELEASE ORAL
Qty: 180 TABLET | Refills: 1 | Status: SHIPPED | OUTPATIENT
Start: 2024-06-20

## 2024-06-20 RX ORDER — METOPROLOL TARTRATE 50 MG/1
TABLET, FILM COATED ORAL
Qty: 60 TABLET | Refills: 5 | Status: SHIPPED | OUTPATIENT
Start: 2024-06-20

## 2024-06-20 RX ORDER — ERGOCALCIFEROL 1.25 MG/1
50000 CAPSULE ORAL WEEKLY
Qty: 4 CAPSULE | Refills: 5 | Status: SHIPPED | OUTPATIENT
Start: 2024-06-20

## 2024-10-15 ENCOUNTER — HOSPITAL ENCOUNTER (EMERGENCY)
Age: 54
Discharge: HOME | End: 2024-10-15

## 2024-10-15 VITALS
HEIGHT: 66 IN | TEMPERATURE: 98.4 F | DIASTOLIC BLOOD PRESSURE: 66 MMHG | HEART RATE: 64 BPM | RESPIRATION RATE: 16 BRPM | WEIGHT: 200 LBS | SYSTOLIC BLOOD PRESSURE: 129 MMHG | OXYGEN SATURATION: 100 % | BODY MASS INDEX: 32.14 KG/M2

## 2024-10-15 DIAGNOSIS — E11.65 TYPE 2 DIABETES MELLITUS WITH HYPERGLYCEMIA, WITHOUT LONG-TERM CURRENT USE OF INSULIN (HCC): Primary | ICD-10-CM

## 2024-10-15 DIAGNOSIS — R79.89 ELEVATED LFTS: ICD-10-CM

## 2024-10-15 LAB
ALBUMIN SERPL-MCNC: 4.4 G/DL (ref 3.4–5)
ALBUMIN/GLOB SERPL: 1.7 {RATIO} (ref 1.1–2.2)
ALP SERPL-CCNC: 123 U/L (ref 40–129)
ALT SERPL-CCNC: 42 U/L (ref 10–40)
ANION GAP SERPL CALCULATED.3IONS-SCNC: 13 MMOL/L (ref 9–17)
AST SERPL-CCNC: 48 U/L (ref 15–37)
BASOPHILS # BLD: 0.05 K/UL
BASOPHILS NFR BLD: 1 % (ref 0–1)
BILIRUB SERPL-MCNC: 0.3 MG/DL (ref 0–1)
BILIRUB UR QL STRIP: NEGATIVE
BUN SERPL-MCNC: 8 MG/DL (ref 7–20)
CALCIUM SERPL-MCNC: 9.9 MG/DL (ref 8.3–10.6)
CHARACTER UR: ABNORMAL
CHLORIDE SERPL-SCNC: 95 MMOL/L (ref 99–110)
CLARITY UR: CLEAR
CO2 SERPL-SCNC: 26 MMOL/L (ref 21–32)
COLOR UR: YELLOW
CREAT SERPL-MCNC: 0.7 MG/DL (ref 0.6–1.1)
EOSINOPHIL # BLD: 0.2 K/UL
EOSINOPHILS RELATIVE PERCENT: 4 % (ref 0–3)
EPI CELLS #/AREA URNS HPF: 1 /HPF
ERYTHROCYTE [DISTWIDTH] IN BLOOD BY AUTOMATED COUNT: 14.8 % (ref 11.7–14.9)
EST. AVERAGE GLUCOSE BLD GHB EST-MCNC: 298 MG/DL
GFR, ESTIMATED: 86 ML/MIN/1.73M2
GLUCOSE BLD-MCNC: 293 MG/DL (ref 74–99)
GLUCOSE BLD-MCNC: 439 MG/DL (ref 74–99)
GLUCOSE SERPL-MCNC: 401 MG/DL (ref 74–99)
GLUCOSE UR STRIP-MCNC: >=1000 MG/DL
HBA1C MFR BLD: 12 % (ref 4.2–6.3)
HCG UR QL: NEGATIVE
HCT VFR BLD AUTO: 39.7 % (ref 37–47)
HGB BLD-MCNC: 11.9 G/DL (ref 12.5–16)
HGB UR QL STRIP.AUTO: NEGATIVE
IMM GRANULOCYTES # BLD AUTO: 0.01 K/UL
IMM GRANULOCYTES NFR BLD: 0 %
KETONES UR STRIP-MCNC: NEGATIVE MG/DL
LEUKOCYTE ESTERASE UR QL STRIP: NEGATIVE
LYMPHOCYTES NFR BLD: 1.86 K/UL
LYMPHOCYTES RELATIVE PERCENT: 37 % (ref 24–44)
MCH RBC QN AUTO: 23.5 PG (ref 27–31)
MCHC RBC AUTO-ENTMCNC: 30 G/DL (ref 32–36)
MCV RBC AUTO: 78.3 FL (ref 78–100)
MONOCYTES NFR BLD: 0.34 K/UL
MONOCYTES NFR BLD: 7 % (ref 0–4)
NEUTROPHILS NFR BLD: 51 % (ref 36–66)
NEUTS SEG NFR BLD: 2.53 K/UL
NITRITE UR QL STRIP: POSITIVE
PH UR STRIP: 5.5 [PH] (ref 5–8)
PLATELET, FLUORESCENCE: 183 K/UL (ref 140–440)
PMV BLD AUTO: 12.8 FL (ref 7.5–11.1)
POTASSIUM SERPL-SCNC: 4.7 MMOL/L (ref 3.5–5.1)
PROT SERPL-MCNC: 7 G/DL (ref 6.4–8.2)
PROT UR STRIP-MCNC: NEGATIVE MG/DL
RBC # BLD AUTO: 5.07 M/UL (ref 4.2–5.4)
RBC #/AREA URNS HPF: <1 /HPF (ref 0–2)
SODIUM SERPL-SCNC: 133 MMOL/L (ref 136–145)
SP GR UR STRIP: 1.02 (ref 1–1.03)
TRICHOMONAS #/AREA URNS HPF: ABNORMAL /[HPF]
UROBILINOGEN UR STRIP-ACNC: 0.2 EU/DL (ref 0–1)
WBC #/AREA URNS HPF: 1 /HPF (ref 0–5)
WBC OTHER # BLD: 5 K/UL (ref 4–10.5)

## 2024-10-15 PROCEDURE — 87077 CULTURE AEROBIC IDENTIFY: CPT

## 2024-10-15 PROCEDURE — 84703 CHORIONIC GONADOTROPIN ASSAY: CPT

## 2024-10-15 PROCEDURE — 99283 EMERGENCY DEPT VISIT LOW MDM: CPT

## 2024-10-15 PROCEDURE — 87186 SC STD MICRODIL/AGAR DIL: CPT

## 2024-10-15 PROCEDURE — 87086 URINE CULTURE/COLONY COUNT: CPT

## 2024-10-15 PROCEDURE — 82962 GLUCOSE BLOOD TEST: CPT

## 2024-10-15 PROCEDURE — 81001 URINALYSIS AUTO W/SCOPE: CPT

## 2024-10-15 PROCEDURE — 80053 COMPREHEN METABOLIC PANEL: CPT

## 2024-10-15 PROCEDURE — 85025 COMPLETE CBC W/AUTO DIFF WBC: CPT

## 2024-10-15 PROCEDURE — 83036 HEMOGLOBIN GLYCOSYLATED A1C: CPT

## 2024-10-15 ASSESSMENT — PAIN SCALES - GENERAL: PAINLEVEL_OUTOF10: 0

## 2024-10-15 NOTE — ED PROVIDER NOTES
[facility] EMERGENCY DEPARTMENT  EMERGENCY DEPARTMENT ENCOUNTER        Pt Name: Tiny Medrano  MRN: [id number]  Birthdate 1970  Date of evaluation: 10/15/2024  Provider: Ciro De La Garza PA-C  PCP: Milana Egan MD      JEREL. I have evaluated this patient.        CHIEF COMPLAINT      Chief Complaint   Patient presents with    Hyperglycemia     Over 500       HISTORY OF PRESENT ILLNESS:     History from : Patient    Limitations to history : None    Tiny Medrano is a 54 y.o. female with past medical history of non-insulin-dependent type 2 diabetes who presents with concern for elevated blood glucose.  Had noticed it has been high for few days this week.  Additionally she tells me she.  Had \"felt off\" this week similar to when blood sugars were high, Today this morning had  felt  \"dizzy\" and nauseas; persisting at work.  She checked glucose monitor 496.  Mentions this is how she feels when her blood sugars are elevated.  Today she had called PCP and was advised to go to the emergency department.  Currently metformin 1000 mg x 1 once a day.   Noticed increased urinary frequency.   Checks BG at home 2-3/week 300's.  Denies: abd pain, Uri symptoms, dysuria, flank pain, diarrhea, chest pain, shortness of breath, vomiting    Nursing Notes were all reviewed and agreed with or any disagreements were addressed in the HPI.    REVIEW OF SYSTEMS :     Review of Systems   All other systems reviewed and are negative.      Pertinent positives and negatives are stated within HPI    PAST HISTORY   has a past medical history of Depression, Fibromyalgia, GERD (gastroesophageal reflux disease), SVT (supraventricular tachycardia) ([facility]), and Type 2 diabetes mellitus without complication ([facility]).    Past Surgical History:   Procedure Laterality Date    DILATION AND CURETTAGE OF UTERUS N/A 3/31/2021    DILATATION AND CURETTAGE HYSTEROSCOPY performed by Jaxon Huff DO at

## 2024-10-15 NOTE — ED TRIAGE NOTES
Patient diagnosed with diabetes about a year ago, has been taking metformin. Blood sugars have been becoming more elevated, then the last few days readings at home have been over 500. Feeling bad today, heavy, and nauseated.

## 2024-10-15 NOTE — DISCHARGE INSTRUCTIONS
Keep your appointment scheduled with your primary care provider on October 18.  Call the office to confirm this appointment.    Follow-up with your primary care provider to discuss the results of your urine culture, and your A1c blood test.    Follow-up with your primary care provider to discuss the results of your liver function test which were mildly elevated today.    Return with any abdominal pain, vomiting, weakness, confusion, worsening symptoms or any new concerns.    Meanwhile please increase your metformin taking an additional one tablet in the evening in addition to the 2 that you take in the morning.  Follow-up with your primary care provider to discuss any changes to your medication.

## 2024-10-17 ENCOUNTER — TELEPHONE (OUTPATIENT)
Dept: PHARMACY | Age: 54
End: 2024-10-17

## 2024-10-17 LAB
MICROORGANISM SPEC CULT: ABNORMAL
SERVICE CMNT-IMP: ABNORMAL
SPECIMEN DESCRIPTION: ABNORMAL

## 2024-10-17 RX ORDER — NITROFURANTOIN 25; 75 MG/1; MG/1
100 CAPSULE ORAL 2 TIMES DAILY
Qty: 10 CAPSULE | Refills: 0 | Status: SHIPPED | OUTPATIENT
Start: 2024-10-17 | End: 2024-10-18

## 2024-10-17 NOTE — TELEPHONE ENCOUNTER
Pharmacy Note  ED Culture Follow-up     Tiny Medrano is a 54 y.o. female.      Allergies: Patient has no known allergies.      Labs:        Lab Results   Component Value Date     BUN 8 10/15/2024     CREATININE 0.7 10/15/2024     WBC 5.0 10/15/2024      Estimated Creatinine Clearance: 107 mL/min (based on SCr of 0.7 mg/dL).     Current antimicrobials:   none     ASSESSMENT:  Micro results:   Urine culture: positive for E. Coli >100,000 cfu/ml     PLAN:  Need for intervention: Yes  Discussed with: Dr. Foster  Chosen treatment:    Macrobid 100 mg take one capsule twice daily for 5 days     Patient response:   Called and spoke with patient.    Counseled patient on importance of completing full duration of therapy, and following up with PCP or OB.      Called/sent in prescription to: [address]      Please call with any questions. Ext. [phone]     Addison Morales, PharmD 3:10 PM 10/17/2024

## 2024-10-17 NOTE — PROGRESS NOTES
Pharmacy Note  ED Culture Follow-up    Tiny Medrano is a 54 y.o. female.     Allergies: Patient has no known allergies.     Labs:  Lab Results   Component Value Date    BUN 8 10/15/2024    CREATININE 0.7 10/15/2024    WBC 5.0 10/15/2024     Estimated Creatinine Clearance: 107 mL/min (based on SCr of 0.7 mg/dL).    Current antimicrobials:   none    ASSESSMENT:  Micro results:   Urine culture: positive for E. Coli >100,000 cfu/ml     PLAN:  Need for intervention: Yes  Discussed with: Dr. Foster  Chosen treatment:    Macrobid 100 mg take one capsule twice daily for 5 days    Patient response:   Called and spoke with patient.    Counseled patient on importance of completing full duration of therapy, and following up with PCP or OB.     Called/sent in prescription to: [address]     Please call with any questions. Ext. [phone]    Addison Morales, PharmD 3:10 PM 10/17/2024

## 2024-10-18 ENCOUNTER — OFFICE VISIT (OUTPATIENT)
Dept: FAMILY MEDICINE CLINIC | Age: 54
End: 2024-10-18

## 2024-10-18 VITALS
OXYGEN SATURATION: 98 % | SYSTOLIC BLOOD PRESSURE: 146 MMHG | HEART RATE: 94 BPM | DIASTOLIC BLOOD PRESSURE: 90 MMHG | BODY MASS INDEX: 32.28 KG/M2 | WEIGHT: 200 LBS

## 2024-10-18 DIAGNOSIS — F41.1 GAD (GENERALIZED ANXIETY DISORDER): ICD-10-CM

## 2024-10-18 DIAGNOSIS — E11.65 TYPE 2 DIABETES MELLITUS WITH HYPERGLYCEMIA, WITHOUT LONG-TERM CURRENT USE OF INSULIN (HCC): Primary | ICD-10-CM

## 2024-10-18 DIAGNOSIS — E53.8 B12 DEFICIENCY: ICD-10-CM

## 2024-10-18 DIAGNOSIS — R53.83 FATIGUE, UNSPECIFIED TYPE: ICD-10-CM

## 2024-10-18 DIAGNOSIS — R00.2 PALPITATION: ICD-10-CM

## 2024-10-18 PROCEDURE — 99213 OFFICE O/P EST LOW 20 MIN: CPT | Performed by: FAMILY MEDICINE

## 2024-10-18 PROCEDURE — 3046F HEMOGLOBIN A1C LEVEL >9.0%: CPT | Performed by: FAMILY MEDICINE

## 2024-10-18 RX ORDER — ERGOCALCIFEROL 1.25 MG/1
50000 CAPSULE, LIQUID FILLED ORAL WEEKLY
Qty: 4 CAPSULE | Refills: 5 | Status: SHIPPED | OUTPATIENT
Start: 2024-10-18

## 2024-10-18 RX ORDER — ESCITALOPRAM OXALATE 20 MG/1
10 TABLET ORAL DAILY
Qty: 30 TABLET | Refills: 5 | Status: SHIPPED | OUTPATIENT
Start: 2024-10-18

## 2024-10-18 RX ORDER — GLIPIZIDE 5 MG/1
5 TABLET, FILM COATED, EXTENDED RELEASE ORAL DAILY
Qty: 30 TABLET | Refills: 2 | Status: SHIPPED | OUTPATIENT
Start: 2024-10-18

## 2024-10-18 RX ORDER — METOPROLOL TARTRATE 50 MG
TABLET ORAL
Qty: 60 TABLET | Refills: 5 | Status: SHIPPED | OUTPATIENT
Start: 2024-10-18

## 2024-10-18 RX ORDER — CYANOCOBALAMIN 1000 UG/ML
1000 INJECTION, SOLUTION INTRAMUSCULAR; SUBCUTANEOUS
Qty: 2 ML | Refills: 5 | Status: SHIPPED | OUTPATIENT
Start: 2024-10-18

## 2024-10-18 RX ORDER — METFORMIN HCL 500 MG
TABLET, EXTENDED RELEASE 24 HR ORAL
Qty: 90 TABLET | Refills: 2 | Status: SHIPPED | OUTPATIENT
Start: 2024-10-18

## 2024-10-18 RX ORDER — SYRINGE WITH NEEDLE, 1 ML 25GX5/8"
SYRINGE, EMPTY DISPOSABLE MISCELLANEOUS
Qty: 10 EACH | Refills: 2 | Status: SHIPPED | OUTPATIENT
Start: 2024-10-18

## 2024-10-18 RX ORDER — METFORMIN HCL 500 MG
TABLET, EXTENDED RELEASE 24 HR ORAL
Qty: 180 TABLET | Refills: 1 | Status: SHIPPED | OUTPATIENT
Start: 2024-10-18 | End: 2024-10-18 | Stop reason: SDUPTHER

## 2024-10-18 SDOH — ECONOMIC STABILITY: FOOD INSECURITY: WITHIN THE PAST 12 MONTHS, YOU WORRIED THAT YOUR FOOD WOULD RUN OUT BEFORE YOU GOT MONEY TO BUY MORE.: PATIENT DECLINED

## 2024-10-18 SDOH — ECONOMIC STABILITY: FOOD INSECURITY: WITHIN THE PAST 12 MONTHS, THE FOOD YOU BOUGHT JUST DIDN'T LAST AND YOU DIDN'T HAVE MONEY TO GET MORE.: PATIENT DECLINED

## 2024-10-18 SDOH — ECONOMIC STABILITY: INCOME INSECURITY: HOW HARD IS IT FOR YOU TO PAY FOR THE VERY BASICS LIKE FOOD, HOUSING, MEDICAL CARE, AND HEATING?: PATIENT DECLINED

## 2024-10-18 NOTE — PROGRESS NOTES
Tiny has not been seen by us since January when she was seen for diabetic check and some health maintenance issues along with fatigue and palpitations.  We had ordered a GLP-1 which was not covered.  She did not follow-up with us since then but went to the ER several days a where they evaluated her and found her sugar elevated, they did increase her metformin dose temporarily, her A1c for them was 12 and we asked them to have her follow-up in the office  
at night. Glucotrol XL 5 mg once daily will be added to her metformin regimen. She will need to complete the labs that were ordered in 01/2024. Her liver enzymes will be rechecked. Her thyroid and B12 levels will be checked. Her blood pressure will be monitored. She will reapply for [employer] coverage if she complies with followup.  . Her metformin prescription will be refilled. She will bring her blood sugar readings to her next visit.    Follow-up  She will follow up in 2 months.    See orders and comments above for details of workup or medication orders.          _________________________________________________  Plan:     Return in about 2 months (around 12/18/2024), or if symptoms worsen or fail to improve, for recheck DM with FBW (liver, cbc)  and A1c.      Electronically signed by Milana Egan MD on 10/18/24 at 3:56 PM EDT    Portions of this note have been transcribed using automated transcription software, and errors in documentation may occur as a result of inaccurate transcription.

## 2024-10-19 ASSESSMENT — ENCOUNTER SYMPTOMS
SHORTNESS OF BREATH: 0
BACK PAIN: 0
ABDOMINAL PAIN: 0
COUGH: 0
WHEEZING: 0
CHEST TIGHTNESS: 0

## 2025-01-17 DIAGNOSIS — F41.1 GAD (GENERALIZED ANXIETY DISORDER): ICD-10-CM

## 2025-01-17 RX ORDER — ESCITALOPRAM OXALATE 20 MG/1
10 TABLET ORAL DAILY
Qty: 30 TABLET | Refills: 5 | Status: SHIPPED | OUTPATIENT
Start: 2025-01-17

## 2025-04-25 DIAGNOSIS — E11.65 TYPE 2 DIABETES MELLITUS WITH HYPERGLYCEMIA, WITHOUT LONG-TERM CURRENT USE OF INSULIN (HCC): ICD-10-CM

## 2025-04-28 RX ORDER — METFORMIN HYDROCHLORIDE 500 MG/1
TABLET, EXTENDED RELEASE ORAL
Qty: 90 TABLET | Refills: 1 | Status: SHIPPED | OUTPATIENT
Start: 2025-04-28

## 2025-04-28 RX ORDER — GLIPIZIDE 5 MG/1
5 TABLET, FILM COATED, EXTENDED RELEASE ORAL DAILY
Qty: 30 TABLET | Refills: 1 | Status: SHIPPED | OUTPATIENT
Start: 2025-04-28

## 2025-05-13 RX ORDER — OMEPRAZOLE 20 MG/1
20 CAPSULE, DELAYED RELEASE ORAL DAILY
Qty: 90 CAPSULE | Refills: 1 | Status: SHIPPED | OUTPATIENT
Start: 2025-05-13

## 2025-05-22 ENCOUNTER — COMMUNITY OUTREACH (OUTPATIENT)
Dept: FAMILY MEDICINE CLINIC | Age: 55
End: 2025-05-22

## 2025-05-22 NOTE — PROGRESS NOTES
Patient's HM shows they are overdue for Mammogram and hemoglobin A1C.  Care Everywhere and  files searched.  No results to attach to order nor HM updated.

## 2025-06-23 DIAGNOSIS — R53.83 FATIGUE, UNSPECIFIED TYPE: ICD-10-CM

## 2025-06-24 RX ORDER — ERGOCALCIFEROL 1.25 MG/1
50000 CAPSULE, LIQUID FILLED ORAL WEEKLY
Qty: 4 CAPSULE | Refills: 5 | Status: SHIPPED | OUTPATIENT
Start: 2025-06-24

## (undated) DEVICE — TOWEL,OR,DSP,ST,BLUE,STD,6/PK,12PK/CS: Brand: MEDLINE

## (undated) DEVICE — SOLUTION IV 100ML 0.9% SOD CHL PLAS CONT USP VIAFLX 1 PER

## (undated) DEVICE — SET IRRIG L94IN ID0.281IN W/ 4.5IN DST FLX CONN 2 LD ON OFF

## (undated) DEVICE — TUBING, SUCTION, 9/32" X 10', STRAIGHT: Brand: MEDLINE

## (undated) DEVICE — TRAY PREP DRY W/ PREM GLV 2 APPL 6 SPNG 2 UNDPD 1 OVERWRAP

## (undated) DEVICE — PAD,SANITARY,11 IN,MAXI,W/WINGS,N-STRL: Brand: MEDLINE

## (undated) DEVICE — COVER,MAYO STAND,STERILE: Brand: MEDLINE

## (undated) DEVICE — GAUZE,SPONGE,4"X4",16PLY,XRAY,STRL,LF: Brand: MEDLINE

## (undated) DEVICE — 1016 S-DRAPE IRRIG POUCH 10/BOX: Brand: STERI-DRAPE™

## (undated) DEVICE — PAD MATERNITY CURITY ADH STRIP DISP

## (undated) DEVICE — MARKER SURG SKIN UTIL REGULAR/FINE 2 TIP W/ RUL AND 9 LBL

## (undated) DEVICE — SINGLE PORT MANIFOLD: Brand: NEPTUNE 2

## (undated) DEVICE — TELFA NON-ADHERENT ABSORBENT DRESSING: Brand: TELFA